# Patient Record
Sex: FEMALE | Race: BLACK OR AFRICAN AMERICAN | NOT HISPANIC OR LATINO | Employment: UNEMPLOYED | URBAN - METROPOLITAN AREA
[De-identification: names, ages, dates, MRNs, and addresses within clinical notes are randomized per-mention and may not be internally consistent; named-entity substitution may affect disease eponyms.]

---

## 2017-03-19 ENCOUNTER — ANESTHESIA EVENT (OUTPATIENT)
Dept: SURGERY | Facility: CLINIC | Age: 62
End: 2017-03-19

## 2017-03-20 ENCOUNTER — OFFICE VISIT (OUTPATIENT)
Dept: SURGERY | Facility: CLINIC | Age: 62
End: 2017-03-20

## 2017-03-20 ENCOUNTER — ALLIED HEALTH/NURSE VISIT (OUTPATIENT)
Dept: SURGERY | Facility: CLINIC | Age: 62
End: 2017-03-20

## 2017-03-20 VITALS
OXYGEN SATURATION: 100 % | HEART RATE: 94 BPM | DIASTOLIC BLOOD PRESSURE: 84 MMHG | WEIGHT: 194 LBS | RESPIRATION RATE: 14 BRPM | TEMPERATURE: 97.4 F | SYSTOLIC BLOOD PRESSURE: 137 MMHG

## 2017-03-20 DIAGNOSIS — H80.90 OTOSCLEROSIS, UNSPECIFIED LATERALITY: ICD-10-CM

## 2017-03-20 DIAGNOSIS — Z01.818 PRE-OP EXAM: Primary | ICD-10-CM

## 2017-03-20 PROBLEM — E66.9 OBESITY: Status: ACTIVE | Noted: 2017-03-20

## 2017-03-20 LAB
ANION GAP SERPL CALCULATED.3IONS-SCNC: 11 MMOL/L (ref 3–14)
BUN SERPL-MCNC: 9 MG/DL (ref 7–30)
CALCIUM SERPL-MCNC: 9.1 MG/DL (ref 8.5–10.1)
CHLORIDE SERPL-SCNC: 107 MMOL/L (ref 94–109)
CO2 SERPL-SCNC: 22 MMOL/L (ref 20–32)
CREAT SERPL-MCNC: 0.44 MG/DL (ref 0.52–1.04)
ERYTHROCYTE [DISTWIDTH] IN BLOOD BY AUTOMATED COUNT: 14.2 % (ref 10–15)
GFR SERPL CREATININE-BSD FRML MDRD: ABNORMAL ML/MIN/1.7M2
GLUCOSE SERPL-MCNC: 82 MG/DL (ref 70–99)
HCT VFR BLD AUTO: 41.9 % (ref 35–47)
HGB BLD-MCNC: 13.3 G/DL (ref 11.7–15.7)
MCH RBC QN AUTO: 29 PG (ref 26.5–33)
MCHC RBC AUTO-ENTMCNC: 31.7 G/DL (ref 31.5–36.5)
MCV RBC AUTO: 92 FL (ref 78–100)
PLATELET # BLD AUTO: 203 10E9/L (ref 150–450)
POTASSIUM SERPL-SCNC: 4 MMOL/L (ref 3.4–5.3)
RBC # BLD AUTO: 4.58 10E12/L (ref 3.8–5.2)
SODIUM SERPL-SCNC: 140 MMOL/L (ref 133–144)
WBC # BLD AUTO: 7.5 10E9/L (ref 4–11)

## 2017-03-20 RX ORDER — ALBUTEROL SULFATE 0.83 MG/ML
2.5 SOLUTION RESPIRATORY (INHALATION)
Status: CANCELLED | OUTPATIENT
Start: 2017-03-20

## 2017-03-20 RX ORDER — CHOLECALCIFEROL (VITAMIN D3) 50 MCG
2000 TABLET ORAL DAILY
COMMUNITY
Start: 2017-01-17 | End: 2019-06-05

## 2017-03-20 RX ORDER — ALBUTEROL SULFATE 0.83 MG/ML
2.5 SOLUTION RESPIRATORY (INHALATION) DAILY
COMMUNITY
Start: 2016-06-30 | End: 2017-04-14

## 2017-03-20 RX ORDER — LORATADINE 10 MG/1
10 TABLET ORAL DAILY
COMMUNITY
Start: 2015-09-11 | End: 2019-02-25

## 2017-03-20 RX ORDER — ALBUTEROL SULFATE 90 UG/1
2 AEROSOL, METERED RESPIRATORY (INHALATION) DAILY
COMMUNITY
Start: 2016-06-30 | End: 2019-02-25

## 2017-03-20 RX ORDER — FLUTICASONE PROPIONATE 50 MCG
2 SPRAY, SUSPENSION (ML) NASAL
COMMUNITY
Start: 2016-06-30 | End: 2019-02-25

## 2017-03-20 RX ORDER — BUDESONIDE AND FORMOTEROL FUMARATE DIHYDRATE 160; 4.5 UG/1; UG/1
2 AEROSOL RESPIRATORY (INHALATION) 2 TIMES DAILY
COMMUNITY
End: 2019-02-25

## 2017-03-20 RX ORDER — CETIRIZINE HYDROCHLORIDE 10 MG/1
10 TABLET ORAL DAILY
COMMUNITY
Start: 2016-09-23

## 2017-03-20 NOTE — Clinical Note
Dr Reyna,  I had the pleasure of seeing your patient in the PAC. Please find the attached H&P.  If you have any questions or concerns I can be reached in the PAC.  Best Regards,  DEEPALI Josue  [facility] Preoperative Assessment Center Phone: [phone] Fax: [phone]

## 2017-03-20 NOTE — PATIENT INSTRUCTIONS
Preparing for Your Surgery      Name:  Karolina Scott   MRN:  [id number]   :  1955   Today's Date:  3/20/2017     Arriving for surgery: Marisol from Dr Reyna's office will call you the day before surgery with the time.  Surgery date:  4/3/17  Surgery time:  To be determined  Arrival time:  To be determined  Please come to:     [facility] [address]  [address] [place]  [address]    - parking is available in front of [facility] from 5:15AM to 8:00PM. If you prefer, park your car in the Green Lot. Enter through the main entrance of the [facility].    -Take the elevator on the right to the 3rd floor, check in at the Adult Surgery Waiting Lounge, also known as 3 A.  [phone]    If an escort is needed stop at the Information Desk in the lobby. Inform the information person that you are here for surgery. An escort to the Adult Surgery Waiting Lounge will be provided.        What can I eat or drink? Nothing to eat or drink after 11:30 pm . Ask Marisol when she calls you for more specific times.  -  You may have solid food or milk products until 8 hours prior to your surgery.  -  You may have water until 2 hours prior to your surgery.    Which medicines can I take? Do not take Aspirin, vitamins, or supplements for 7 days before surgery.    -  It is OKAY to take these medications: Albuterol inhaler, Albuterol nebulizer, Omeprazole (Prilosec),Cetirizine (Zyrtec) if needed, Loratadine (Claritin) if needed, Flonase nasal spray if needed.      How do I prepare myself?  -  Take two showers: one the night before surgery; and one the morning of surgery.         Use 1/2 bottle of Scrubcare to wash from neck down for each shower. You may use your own shampoo and conditioner first.   -  Do NOT use lotion, powder, deodorant, hair spray or gel  the day of your surgery.  -  Do NOT wear any makeup, fingernail polish or jewelry.  .  -Do not bring your  own medications to the hospital, except for inhalers.  -  Bring your ID and insurance card.    Questions or Concerns:  If you have questions or concerns, please call the  Preoperative Assessment Center, Monday-Friday 7AM-7PM:  [phone]    AFTER YOUR SURGERY  Breathing exercises   Breathing exercises help you recover faster. Take deep breaths and let the air out slowly. This will:     Help you wake up after surgery.    Help prevent complications like pneumonia.  Preventing complications will help you go home sooner.   We may give you a breathing device (incentive spirometer) to encourage you to breathe deeply.   Nausea and vomiting   You may feel sick to your stomach after surgery; if so, let your nurse know.    Pain control:  After surgery, you may have pain. Our goal is to help you manage your pain. Pain medicine will help you feel comfortable enough to do activities that will help you heal.  These activities may include breathing exercises, walking and physical therapy.   To help your health care team treat your pain we will ask: 1) If you have pain  2) where it is located 3) describe your pain in your words  Methods of pain control include medications given by mouth, vein or by nerve block for some surgeries.  We may give you a pain control pump that will:  1) Deliver the medicine through a tube placed in your vein  2) Control the amount of medicine you receive  3) Allow you to push a button to deliver a dose of pain medicine  Sequential Compression Device (SCD) or Pneumo Boots:  You may need to wear SCD S on your legs or feet. These are wraps connected to a machine that pumps in air and releases it. The repeated pumping helps prevent blood clots from forming.

## 2017-03-20 NOTE — H&P
Pre-Operative H & P     CC:  Preoperative exam to assess for increased cardiopulmonary risk while undergoing surgery and anesthesia.    Date of Encounter: 3/20/2017  Primary Care Physician:  No primary care provider on file.    AIDAN Scott is a 62 year old female who presents for pre-operative H & P in preparation for Right Transcanal Exploratory Tympanotomy, Probable Stapedectomy, Myringotomy And Tube Right Ear with Dr. Moore on 4/3/2017  at [facility].   She is here today with her son and an .     She has history of moderate persistent asthma and seasonal allergies.  She states over the past 2 weeks she has had a productive cough with green sputum, some congestion and increased wheezing and SOB.  She also has had some mild fevers.  She denies sore throat, rhinitis, chills or body aches.   She has increased the amount of times she uses her nebulizer and has increased wheezing at night in particular.  She hasn't seen her PCP.  She saw pulmonology last in 2016.       History is obtained from the patient.     Past Medical History  Past Medical History   Diagnosis Date     Asthma, mild persistent      GERD (gastroesophageal reflux disease)      Obesity      Seasonal allergies        Past Surgical History  Past Surgical History   Procedure Laterality Date     Cataract iol, rt/lt        section         Hx of Blood transfusions/reactions: Denies     Hx of abnormal bleeding or anti-platelet use: Denies    Menstrual history: No LMP recorded.:    Steroid use in the last year: Denies    Personal or FH with difficulty with Anesthesia:  Denies    Prior to Admission Medications  Current Outpatient Prescriptions   Medication Sig Dispense Refill     albuterol (2.5 MG/3ML) 0.083% neb solution Inhale 2.5 mg into the lungs daily       albuterol (PROAIR HFA/PROVENTIL HFA/VENTOLIN HFA) 108 (90 BASE) MCG/ACT Inhaler Inhale 2 puffs into the lungs daily        Cholecalciferol (VITAMIN D) 2000 UNITS tablet Take 2,000 Units by mouth daily       cetirizine (ZYRTEC) 10 MG tablet Take 10 mg by mouth daily       loratadine (CLARITIN) 10 MG tablet Take 10 mg by mouth daily       omeprazole (PRILOSEC) 20 MG CR capsule Take 20 mg by mouth daily with food       fluticasone (FLONASE) 50 MCG/ACT spray 2 sprays       budesonide-formoterol (SYMBICORT) 160-4.5 MCG/ACT Inhaler Inhale 2 puffs into the lungs 2 times daily         Allergies  Allergies   Allergen Reactions     Corticosteroids Swelling     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     Ibuprofen        Social History  Social History     Social History     Marital status: N/A     Spouse name: N/A     Number of children: N/A     Years of education: N/A     Occupational History     Not on file.     Social History Main Topics     Smoking status: Never Smoker     Smokeless tobacco: Not on file     Alcohol use No     Drug use: Not on file     Sexual activity: Not on file     Other Topics Concern     Not on file     Social History Narrative     No narrative on file       Family History  History reviewed. No pertinent family history.    Review of Systems  Functional status: Independent in ADL's.   METS > 4 .  If no can be limited by her asthma    The complete review of systems is negative other than noted in the HPI or here.     C: NEGATIVE for fever, chills, change in weight  INTEGUMENTARY/SKIN: NEGATIVE for worrisome rashes, moles or lesions  E/M: NEGATIVE for ear, mouth and throat problems  R: see HPI  CV: NEGATIVE for chest pain, palpitations or peripheral edema  GI: NEGATIVE for nausea, abdominal pain, heartburn, or change in bowel habits  :no current menses  MUSCULOSKELETAL: NEGATIVE for significant arthralgias or myalgia  NEURO: NEGATIVE for weakness, dizziness or paresthesias  ENDOCRINE: NEGATIVE for temperature  intolerance, skin/hair changes  HEME/ALLERGY/IMMUNE: NEGATIVE for bleeding problems  PSYCHIATRIC: NEGATIVE for changes in mood or affect      Temp: 97.4  F (36.3  C) Temp src: Oral BP: 137/84 Pulse: 94   Resp: 14 SpO2: 100 %         194 lbs 0 oz  Data Unavailable   There is no height or weight on file to calculate BMI.       Physical Exam  Constitutional: Awake, alert, cooperative, no apparent distress, and appears stated age.  Eyes: Pupils equal, round and reactive to light, extra ocular muscles intact, sclera clear, conjunctiva normal.  HENT: Normocephalic, oral pharynx with moist mucus membranes, good dentition. No goiter appreciated.   Respiratory:  Diffuse expiratory wheezing heard throughout, diminished in bases  Cardiovascular: Regular rate and rhythm, normal S1 and S2, and no murmur noted.  Carotids +2, no bruits. No edema. Palpable pulses to radial  DP and PT arteries.   GI: Normal bowel sounds, soft, non-distended, non-tender, difficult to assess masses and hepatosplenomegaly d/t body habitus.  Surgical scars: NA  Lymph/Hematologic: No cervical lymphadenopathy and no supraclavicular lymphadenopathy.  Genitourinary:  N/A  Skin: Warm and dry.  No rashes at anticipated surgical site.   Musculoskeletal: Full ROM of neck. There is no redness, warmth, or swelling of the joints. Gross motor strength is normal.    Neurologic: Awake, alert, oriented to name, place and time. Cranial nerves II-XII are grossly intact. Gait is normal.   Neuropsychiatric: Calm, cooperative. Normal affect.     Labs: (personally reviewed)  Last Basic Metabolic Panel:  Lab Results   Component Value Date     03/20/2017      Lab Results   Component Value Date    POTASSIUM 4.0 03/20/2017     Lab Results   Component Value Date    CHLORIDE 107 03/20/2017     Lab Results   Component Value Date    JOSE 9.1 03/20/2017     Lab Results   Component Value Date    CO2 22 03/20/2017     Lab Results   Component Value Date    BUN 9 03/20/2017     Lab  Results   Component Value Date    CR 0.44 03/20/2017     Lab Results   Component Value Date    GLC 82 03/20/2017     CBC RESULTS:   Recent Labs   Lab Test  03/20/17   1259   WBC  7.5   RBC  4.58   HGB  13.3   HCT  41.9   MCV  92   MCH  29.0   MCHC  31.7   RDW  14.2   PLT  203     PFT: ANCILLARY DATA: Spirometry in clinic on 6/30/2016:  FEV1 1.34 or 80 percent of predicted, FVC 1.68 or 78 percent of predicted, with a ratio of 0.80. Results indicate mild restrictive physiology.           ASSESSMENT and PLAN  Karolina Scott is a 62 year old female scheduled to undergo Right Transcanal Exploratory Tympanotomy, Probable Stapedectomy, Myringotomy And Tube Right Ear with Dr. Moore on 4/3/2017  at [facility]. She has the following specific operative considerations:     1. Cardiac- no known history, >4 METS when not symptomatic with asthma  2. RESP- moderate persistent with acute URI and exacerbation of symptoms  - Patient will go see PCP- instructed to notify surgeon if symptoms don't resolve.  (Surgeon also notified today)  -Instructed to take all inhalers the DOS and bring them with   3. Seasonal allergies  - instructed to continue medications  4. Anesthesia- obese, <3 TM however airway appears feasible.  The patient doesn't recall having general however denies previous complications     - RCRI : 0.4% risk of major adverse cardiac event.   - Anesthesia considerations:  Refer to PAC assessment in anesthesia records  - VTE risk:0.5%  - KATHRYN # of risks - Low risk, obese/age  - Risk of PONV score = 2.  If > 2, anti-emetic intervention recommended.      Patient was discussed with Dr. Wilson.    MCKAYLA Weathers CNP  Preoperative Assessment Center  [facility]  Phone: [phone]  Fax: [phone]

## 2017-03-20 NOTE — MR AVS SNAPSHOT
After Visit Summary   3/20/2017    Karolina Scott    MRN: [id number]           Patient Information     Date Of Birth          1955        Visit Information        Provider Department      3/20/2017 10:45 AM Rn,  Pac; [facility] Preoperative Assessment Center        Care Instructions    Preparing for Your Surgery      Name:  Karolina Scott   MRN:  [id number]   :  1955   Today's Date:  3/20/2017     Arriving for surgery: Marisol from Dr Reyna's office will call you the day before surgery with the time.  Surgery date:  4/3/17  Surgery time:  To be determined  Arrival time:  To be determined  Please come to:     [facility] [address]  [address] [place]  [address]    - parking is available in front of [facility] from 5:15AM to 8:00PM. If you prefer, park your car in the Green Lot. Enter through the main entrance of the [facility].    -Take the elevator on the right to the 3rd floor, check in at the Adult Surgery Waiting Lounge, also known as 3 A.  [phone]    If an escort is needed stop at the Information Desk in the lobby. Inform the information person that you are here for surgery. An escort to the Adult Surgery Waiting Lounge will be provided.        What can I eat or drink? Nothing to eat or drink after 11:30 pm . Ask Marisol when she calls you for more specific times.  -  You may have solid food or milk products until 8 hours prior to your surgery.  -  You may have water until 2 hours prior to your surgery.    Which medicines can I take? Do not take Aspirin, vitamins, or supplements for 7 days before surgery.    -  It is OKAY to take these medications: Albuterol inhaler, Albuterol nebulizer, Omeprazole (Prilosec),Cetirizine (Zyrtec) if needed, Loratadine (Claritin) if needed, Flonase nasal spray if needed.      How do I prepare myself?  -  Take two showers: one the night before surgery; and  one the morning of surgery.         Use 1/2 bottle of Scrubcare to wash from neck down for each shower. You may use your own shampoo and conditioner first.   -  Do NOT use lotion, powder, deodorant, hair spray or gel  the day of your surgery.  -  Do NOT wear any makeup, fingernail polish or jewelry.  .  -Do not bring your own medications to the hospital, except for inhalers.  -  Bring your ID and insurance card.    Questions or Concerns:  If you have questions or concerns, please call the  Preoperative Assessment Center, Monday-Friday 7AM-7PM:  [phone]    AFTER YOUR SURGERY  Breathing exercises   Breathing exercises help you recover faster. Take deep breaths and let the air out slowly. This will:     Help you wake up after surgery.    Help prevent complications like pneumonia.  Preventing complications will help you go home sooner.   We may give you a breathing device (incentive spirometer) to encourage you to breathe deeply.   Nausea and vomiting   You may feel sick to your stomach after surgery; if so, let your nurse know.    Pain control:  After surgery, you may have pain. Our goal is to help you manage your pain. Pain medicine will help you feel comfortable enough to do activities that will help you heal.  These activities may include breathing exercises, walking and physical therapy.   To help your health care team treat your pain we will ask: 1) If you have pain  2) where it is located 3) describe your pain in your words  Methods of pain control include medications given by mouth, vein or by nerve block for some surgeries.  We may give you a pain control pump that will:  1) Deliver the medicine through a tube placed in your vein  2) Control the amount of medicine you receive  3) Allow you to push a button to deliver a dose of pain medicine  Sequential Compression Device (SCD) or Pneumo Boots:  You may need to wear SCD S on your legs or feet. These are wraps connected to a machine that pumps in air and  releases it. The repeated pumping helps prevent blood clots from forming.               Follow-ups after your visit        Your next 10 appointments already scheduled     Mar 20, 2017 12:30 PM CDT   (Arrive by 12:25 PM)   PAC Anesthesia Consult with  Pac Anesthesiologist, [employer]   [facility] Preoperative [facility] ([facility])    [address] [place] [address]  [facility] [id number] [phone]            Mar 20, 2017  1:30 PM CDT   LAB with  LAB   [facility] ([facility])    [address] [place] [id number] [phone]           Patient must bring picture ID.  Patient should be prepared to give a urine specimen  Please do not eat 10-12 hours before your appointment if you are coming in fasting for labs on lipids, cholesterol, or glucose (sugar).  Pregnant women should follow their Care Team instructions. Water with medications is okay. Do not drink coffee or other fluids.   If you have concerns about taking  your medications, please ask at office or if scheduling via [employer], send a message by clicking on Secure Messaging, Message Your Care Team.            Apr 03, 2017   Procedure with Manpreet Reyna MD   [facility], [place], Same Day Surgery (--)    [address] [address] [phone]              Who to contact     Please call your clinic at [phone] to:    Ask questions about your health    Make or cancel appointments    Discuss your medicines    Learn about your test results    Speak to your doctor   If you have compliments or concerns about an experience at your clinic, or if you wish to file a complaint, please contact [facility] Patient Relations at [phone] or email us at [email or web address]         Additional Information About Your Visit        Xiam Information     [employer] is an electronic gateway that provides easy, online  access to your medical records. With [employer], you can request a clinic appointment, read your test results, renew a prescription or communicate with your care team.     To sign up for [employer] visit the website at [email or web address]   You will be asked to enter the access code listed below, as well as some personal information. Please follow the directions to create your username and password.     Your access code is: [email or web address]  Expires: 2017  7:30 AM     Your access code will  in 90 days. If you need help or a new code, please contact your [facility] or call [phone] for assistance.        Care EveryWhere ID     This is your Care EveryWhere ID. This could be used by other organizations to access your [facility] medical records  [id number]         Blood Pressure from Last 3 Encounters:   17 137/84    Weight from Last 3 Encounters:   17 88 kg (194 lb)              Today, you had the following     No orders found for display       Primary Care Provider    None Specified       No primary provider on file.        Thank you!     Thank you for choosing [facility] PREOPERATIVE [facility]ESS[facility]  for your care. Our goal is always to provide you with excellent care. Hearing back from our patients is one way we can continue to improve our services. Please take a few minutes to complete the written survey that you may receive in the mail after your visit with us. Thank you!             Your Updated Medication List - Protect others around you: Learn how to safely use, store and throw away your medicines at www.disposemymeds.org.          This list is accurate as of: 3/20/17 12:22 PM.  Always use your most recent med list.                   Brand Name Dispense Instructions for use    * albuterol (2.5 MG/3ML) 0.083% neb solution      Inhale 2.5 mg into the lungs daily       * albuterol 108 (90 BASE) MCG/ACT Inhaler    PROAIR HFA/PROVENTIL HFA/VENTOLIN HFA      Inhale 2 puffs into the lungs daily       budesonide-formoterol 160-4.5 MCG/ACT Inhaler    SYMBICORT     Inhale 2 puffs into the lungs 2 times daily       cetirizine 10 MG tablet    zyrTEC     Take 10 mg by mouth daily       fluticasone 50 MCG/ACT spray    FLONASE     2 sprays       loratadine 10 MG tablet    CLARITIN     Take 10 mg by mouth daily       omeprazole 20 MG CR capsule    priLOSEC     Take 20 mg by mouth daily with food       vitamin D 2000 UNITS tablet      Take 2,000 Units by mouth daily       * Notice:  This list has 2 medication(s) that are the same as other medications prescribed for you. Read the directions carefully, and ask your doctor or other care provider to review them with you.

## 2017-03-20 NOTE — ANESTHESIA PREPROCEDURE EVALUATION
Anesthesia Evaluation     . Pt has had prior anesthetic. Type: MAC      ROS/MED HX    ENT/Pulmonary:     (+)Mild Persistent asthma Treatment: Inhaled steroids and Inhaler daily,  , recent URI unresolved cough, congestion, wheezing for 2 weeks: . .    Neurologic:       Cardiovascular:     (+) ----. : . . . :. . Previous cardiac testing       METS/Exercise Tolerance: Comment: She can walk 20 minutes without chest pain\  2 flights of stairs without symptoms    Hematologic:  - neg hematologic  ROS       Musculoskeletal:  - neg musculoskeletal ROS       GI/Hepatic:     (+) GERD Asymptomatic on medication,       Renal/Genitourinary:         Endo:     (+) Obesity, .      Psychiatric:         Infectious Disease:   (+) Recent Fever,       Malignancy:      - no malignancy   Other:    (+) No chance of pregnancy C-spine cleared: N/A, no H/O Chronic Pain,no other significant disability              Physical Exam  Normal systems: dental    Airway   Mallampati: II  TM distance: <3 FB  Neck ROM: full    Dental     Cardiovascular   Rhythm and rate: regular and normal      Pulmonary (+) wheezes     PE comment: Diffuse wheezing heard throughout               PAC Discussion and Assessment    ASA Classification: 3  Case is suitable for: [facility]  Anesthetic techniques and relevant risks discussed: GA  Invasive monitoring and risk discussed: No  Types:   Possibility and Risk of blood transfusion discussed: No  NPO instructions given: NPO after midnight  Additional anesthetic preparation and risks discussed:   Needs early admission to pre-op area:   Other:     PAC Resident/NP Anesthesia Assessment:  ASSESSMENT and PLAN  Karolina Scott is a 62 year old female scheduled to undergo Right Transcanal Exploratory Tympanotomy, Probable Stapedectomy, Myringotomy And Tube Right Ear with Dr. Moore on 4/3/2017  at [facility]. She has the following specific operative considerations:     1. Cardiac- no  known history, >4 METS when not symptomatic with asthma  2. RESP- moderate persistent with acute URI and exacerbation of symptoms  - Patient will go see PCP- instructed to notify surgeon if symptoms don't resolve.  (Surgeon also notified today)  -Instructed to take all inhalers the DOS and bring them with   3. Seasonal allergies  - instructed to continue medications  4. Anesthesia- obese, <3 TM however airway appears feasible.  The patient doesn't recall having general however denies previous complications     - RCRI : 0.4% risk of major adverse cardiac event.   - VTE risk:0.5%  - KATHRYN # of risks - Low risk, obese/age  - Risk of PONV score = 2.  If > 2, anti-emetic intervention recommended.      Patient was discussed with Dr. Wilson.    MCKAYLA Weathers CNP      Mid-Level Provider/Resident:   Date:   Time:     Attending Anesthesiologist Anesthesia Assessment:  62 year old for transcanal exploratory tympanotomy possible stapedectomy and myringotomy. Chart reviewed, patient seen and evaluated; agree with above assessment.Patient has no cardiac disease, but does have mild persistent asthma with a current exacerbation due to URI. On exam, she does have wheezing, so we have asked her to see her PCP to determine whether any further intervention is appropriate.    Patient is appropriate for the planned procedure without further workup or medical management change. The final anesthesia plan will be determined by the physician anesthesiologist caring for the patient on the day of surgery.      Reviewed and Signed by PAC Anesthesiologist  Anesthesiologist: RYAN  Date: 3/20/2017  Time:   Pass/Fail: Pass  Disposition:     PAC Pharmacist Assessment:        Pharmacist:   Date:   Time:                           .

## 2017-04-13 ENCOUNTER — HOSPITAL ENCOUNTER (EMERGENCY)
Facility: CLINIC | Age: 62
Discharge: HOME | End: 2017-04-14
Attending: EMERGENCY MEDICINE | Admitting: EMERGENCY MEDICINE
Payer: COMMERCIAL

## 2017-04-13 DIAGNOSIS — J45.901 ASTHMA EXACERBATION: ICD-10-CM

## 2017-04-13 PROCEDURE — 94640 AIRWAY INHALATION TREATMENT: CPT

## 2017-04-13 PROCEDURE — 99284 EMERGENCY DEPT VISIT MOD MDM: CPT | Mod: Z6 | Performed by: EMERGENCY MEDICINE

## 2017-04-13 PROCEDURE — 99284 EMERGENCY DEPT VISIT MOD MDM: CPT | Mod: 25

## 2017-04-13 NOTE — ED AVS SNAPSHOT
[facility], [place], Emergency Department    [address]    [place] [address]    Phone:  [phone]    Fax:  [phone]                                       Karolina Scott   MRN: [id number]    Department:  [facility], Emergency Department   Date of Visit:  4/13/2017           After Visit Summary Signature Page     I have received my discharge instructions, and my questions have been answered. I have discussed any challenges I see with this plan with the nurse or doctor.    ..........................................................................................................................................  Patient/Patient Representative Signature      ..........................................................................................................................................  Patient Representative Print Name and Relationship to Patient    ..................................................               ................................................  Date                                            Time    ..........................................................................................................................................  Reviewed by Signature/Title    ...................................................              ..............................................  Date                                                            Time

## 2017-04-13 NOTE — ED AVS SNAPSHOT
[facility], Emergency Department    [address]    [place] [address]    Phone:  [phone]    Fax:  [phone]                                       Karolina Scott   MRN: [id number]    Department:  [facility], Emergency Department   Date of Visit:  4/13/2017           Patient Information     Date Of Birth          1955        Your diagnoses for this visit were:     Asthma exacerbation        You were seen by Richi Nicholas MD.        Discharge Instructions       Take prednisone as directed.  Use neb treatments as needed.  Clinic follow up this week.  Return if persistent symptoms.    24 Hour Appointment Hotline       To make an appointment at any [facility] clinic, call [phone] ([phone]). If you don't have a family doctor or clinic, we will help you find one. [facility] clinics are conveniently located to serve the needs of you and your family.             Review of your medicines      START taking        Dose / Directions Last dose taken    predniSONE 20 MG tablet   Commonly known as:  DELTASONE   Quantity:  10 tablet        Take two tablets (= 40mg) each day for 5 (five) days   Refills:  0          CONTINUE these medicines which may have CHANGED, or have new prescriptions. If we are uncertain of the size of tablets/capsules you have at home, strength may be listed as something that might have changed.        Dose / Directions Last dose taken    * albuterol 108 (90 BASE) MCG/ACT Inhaler   Commonly known as:  PROAIR HFA/PROVENTIL HFA/VENTOLIN HFA   Dose:  2 puff   What changed:  Another medication with the same name was changed. Make sure you understand how and when to take each.        Inhale 2 puffs into the lungs daily   Refills:  0        * albuterol (2.5 MG/3ML) 0.083% neb solution   Dose:  2.5 mg   What changed:    - how to take this  - when to take this  - reasons to take this   Quantity:  360 mL        Take 1 vial (2.5 mg) by nebulization every 4 hours as needed for  shortness of breath / dyspnea or wheezing   Refills:  1        * Notice:  This list has 2 medication(s) that are the same as other medications prescribed for you. Read the directions carefully, and ask your doctor or other care provider to review them with you.      Our records show that you are taking the medicines listed below. If these are incorrect, please call your family doctor or clinic.        Dose / Directions Last dose taken    budesonide-formoterol 160-4.5 MCG/ACT Inhaler   Commonly known as:  SYMBICORT   Dose:  2 puff        Inhale 2 puffs into the lungs 2 times daily   Refills:  0        cetirizine 10 MG tablet   Commonly known as:  zyrTEC   Dose:  10 mg        Take 10 mg by mouth daily   Refills:  0        fluticasone 50 MCG/ACT spray   Commonly known as:  FLONASE   Dose:  2 spray        2 sprays   Refills:  0        loratadine 10 MG tablet   Commonly known as:  CLARITIN   Dose:  10 mg        Take 10 mg by mouth daily   Refills:  0        omeprazole 20 MG CR capsule   Commonly known as:  priLOSEC   Dose:  20 mg        Take 20 mg by mouth daily with food   Refills:  0        vitamin D 2000 UNITS tablet   Dose:  2000 Units        Take 2,000 Units by mouth daily   Refills:  0                Prescriptions were sent or printed at these locations (2 Prescriptions)                   Other Prescriptions                Printed at Department/Unit printer (2 of 2)         albuterol (2.5 MG/3ML) 0.083% neb solution               predniSONE (DELTASONE) 20 MG tablet                Procedures and tests performed during your visit     Chest XR,  PA & LAT      Orders Needing Specimen Collection     None      Pending Results     No orders found for last 3 day(s).            Pending Culture Results     No orders found for last 3 day(s).            Thank you for choosing [facility]       Thank you for choosing [facility] for your care. Our goal is always to provide you with excellent care. Hearing back from our patients is  "one way we can continue to improve our services. Please take a few minutes to complete the written survey that you may receive in the mail after you visit with us. Thank you!        [facility] Information     [facility] lets you send messages to your doctor, view your test results, renew your prescriptions, schedule appointments and more. To sign up, go to [email or web address] . Click on \"Log in\" on the left side of the screen, which will take you to the Welcome page. Then click on \"Sign up Now\" on the right side of the page.     You will be asked to enter the access code listed below, as well as some personal information. Please follow the directions to create your username and password.     Your access code is: [email or web address]  Expires: 2017  7:30 AM     Your access code will  in 90 days. If you need help or a new code, please call your [facility] clinic or [phone].        Care EveryWhere ID     This is your Care EveryWhere ID. This could be used by other organizations to access your [facility] medical records  [id number]        After Visit Summary       This is your record. Keep this with you and show to your community pharmacist(s) and doctor(s) at your next visit.                  "

## 2017-04-14 ENCOUNTER — APPOINTMENT (OUTPATIENT)
Dept: GENERAL RADIOLOGY | Facility: CLINIC | Age: 62
End: 2017-04-14
Attending: EMERGENCY MEDICINE
Payer: COMMERCIAL

## 2017-04-14 VITALS
OXYGEN SATURATION: 97 % | HEART RATE: 108 BPM | DIASTOLIC BLOOD PRESSURE: 73 MMHG | TEMPERATURE: 97.4 F | RESPIRATION RATE: 16 BRPM | SYSTOLIC BLOOD PRESSURE: 142 MMHG

## 2017-04-14 PROCEDURE — 71020 XR CHEST 2 VW: CPT

## 2017-04-14 PROCEDURE — 25000125 ZZHC RX 250: Performed by: EMERGENCY MEDICINE

## 2017-04-14 RX ORDER — IPRATROPIUM BROMIDE AND ALBUTEROL SULFATE 2.5; .5 MG/3ML; MG/3ML
3 SOLUTION RESPIRATORY (INHALATION) ONCE
Status: COMPLETED | OUTPATIENT
Start: 2017-04-14 | End: 2017-04-14

## 2017-04-14 RX ORDER — PREDNISONE 20 MG/1
40 TABLET ORAL ONCE
Status: COMPLETED | OUTPATIENT
Start: 2017-04-14 | End: 2017-04-14

## 2017-04-14 RX ORDER — PREDNISONE 20 MG/1
TABLET ORAL
Qty: 10 TABLET | Refills: 0 | Status: SHIPPED | OUTPATIENT
Start: 2017-04-14 | End: 2019-02-25

## 2017-04-14 RX ORDER — ALBUTEROL SULFATE 0.83 MG/ML
2.5 SOLUTION RESPIRATORY (INHALATION) EVERY 4 HOURS PRN
Qty: 360 ML | Refills: 1 | Status: SHIPPED | OUTPATIENT
Start: 2017-04-14 | End: 2019-02-25

## 2017-04-14 RX ADMIN — IPRATROPIUM BROMIDE AND ALBUTEROL SULFATE 3 ML: .5; 3 SOLUTION RESPIRATORY (INHALATION) at 00:48

## 2017-04-14 RX ADMIN — IPRATROPIUM BROMIDE AND ALBUTEROL SULFATE 3 ML: .5; 3 SOLUTION RESPIRATORY (INHALATION) at 01:36

## 2017-04-14 RX ADMIN — PREDNISONE 40 MG: 20 TABLET ORAL at 02:38

## 2017-04-14 ASSESSMENT — ENCOUNTER SYMPTOMS
COUGH: 1
SHORTNESS OF BREATH: 1
WHEEZING: 1
FEVER: 0

## 2017-04-14 NOTE — DISCHARGE INSTRUCTIONS
Take prednisone as directed.  Use neb treatments as needed.  Clinic follow up this week.  Return if persistent symptoms.

## 2017-04-14 NOTE — ED PROVIDER NOTES
History     Chief Complaint   Patient presents with     Asthma     Pt reports SOB w/cough associated w/asthma. Has taken inhalers/nebs w/no improvement     HPI  Karolina Scott is a 62 year old female with a history of asthma and seasonal allergies who presents to the emergency department today with complaints of increased cough, congestion, shortness of breath and wheezing. Patient reports symptoms over the past 2 weeks, though states they became increasingly worse today. Patient reports using her inhaler and nebulizer without significant relief. She states her symptoms are similar to previous asthma exacerbations. She denies noted fevers. She reports a reaction to prednisone and states she gets diffuse itching with this but it does seem to help her breathing symptoms. No history of hospitalization or intubation for asthma.    I have reviewed the Medications, Allergies, Past Medical and Surgical History, and Social History in the [facility] system.    Past Medical History:   Diagnosis Date     Asthma, mild persistent      GERD (gastroesophageal reflux disease)      Obesity      Seasonal allergies        Past Surgical History:   Procedure Laterality Date     CATARACT IOL, RT/LT        SECTION         No family history on file.    Social History   Substance Use Topics     Smoking status: Never Smoker     Smokeless tobacco: Not on file     Alcohol use No     No current facility-administered medications for this encounter.      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     Ibuprofen        Review of Systems   Constitutional: Negative.  Negative for activity change, appetite change, chills, diaphoresis, fatigue and fever.   HENT: Positive for congestion and rhinorrhea. Negative for ear pain, facial swelling, mouth sores, postnasal drip, sinus pressure, sore throat and trouble swallowing.    Eyes: Negative for pain and visual disturbance.   Respiratory: Positive for cough, shortness of breath and wheezing. Negative for choking and chest tightness.    Cardiovascular: Negative for chest pain, palpitations and leg swelling.   Gastrointestinal: Negative for abdominal pain, diarrhea, nausea and vomiting.   Musculoskeletal: Negative for arthralgias, myalgias and neck pain.   Skin: Negative for rash.   Allergic/Immunologic: Negative for immunocompromised state.   Neurological: Negative for dizziness, syncope, weakness, light-headedness and headaches.   Hematological: Does not bruise/bleed easily.   Psychiatric/Behavioral: Negative for confusion.   All other systems reviewed and are negative.      Physical Exam   BP: 148/72  Pulse: 103  Temp: 97.4  F (36.3  C)  Resp: 18  SpO2: 98 %  Physical Exam   Constitutional: She appears well-developed and well-nourished. No distress.   HENT:   Head: Normocephalic and atraumatic.   Right Ear: Tympanic membrane and ear canal normal.   Left Ear: Tympanic membrane and ear canal normal.   Nose: Mucosal edema and rhinorrhea present.   Mouth/Throat: Uvula is midline, oropharynx is clear and moist and mucous membranes are normal. No oral lesions. No uvula swelling.   Eyes: Conjunctivae and EOM are normal.   Neck: Normal range of motion. Neck supple.   Cardiovascular: Normal rate, regular rhythm, normal heart sounds and intact distal pulses.     Pulmonary/Chest: Effort normal. No respiratory distress. She has wheezes. She has no rales.   Abdominal: Soft. There is no tenderness.   Musculoskeletal: She exhibits no edema or tenderness.   Neurological: She is alert.   Skin: Skin is warm and dry.   Psychiatric: She has a normal mood and affect. Her behavior is normal.   Nursing note and vitals reviewed.      ED Course     ED Course     Procedures             Critical Care time:  none               Labs Ordered and Resulted from Time of ED Arrival Up to the Time of Departure from the ED - No data to display    Assessments & Plan (with Medical Decision Making)   Asthma exacerbation. Much improved with treatment in ED. Will treat with 5 day course of prednisone and albuterol prn. Clinic follow up this week. Return if persistent symptoms. No respiratory distress.    I have reviewed the nursing notes.    I have reviewed the findings, diagnosis, plan and need for follow up with the patient.    Discharge Medication List as of 4/14/2017  2:45 AM      START taking these medications    Details   predniSONE (DELTASONE) 20 MG tablet Take two tablets (= 40mg) each day for 5 (five) days, Disp-10 tablet, R-0, Local Print             Final diagnoses:   Asthma exacerbation   Cookie CROCKER, am serving as a trained medical scribe to document services personally performed by Richi Nicholas MD, based on the provider's statements to me.      Richi CROCKER MD, was physically present and have reviewed and verified the accuracy of this note documented by Cookie Calvert.       4/13/2017   [facility], EMERGENCY DEPARTMENT     Richi Nicholas MD  05/14/17 0025

## 2017-05-14 ASSESSMENT — ENCOUNTER SYMPTOMS
CHEST TIGHTNESS: 0
SINUS PRESSURE: 0
EYE PAIN: 0
ACTIVITY CHANGE: 0
MYALGIAS: 0
DIZZINESS: 0
FACIAL SWELLING: 0
PALPITATIONS: 0
APPETITE CHANGE: 0
BRUISES/BLEEDS EASILY: 0
HEADACHES: 0
NECK PAIN: 0
RHINORRHEA: 1
CHOKING: 0
SORE THROAT: 0
CHILLS: 0
ARTHRALGIAS: 0
DIARRHEA: 0
CONSTITUTIONAL NEGATIVE: 1
FATIGUE: 0
DIAPHORESIS: 0
CONFUSION: 0
ABDOMINAL PAIN: 0
TROUBLE SWALLOWING: 0
LIGHT-HEADEDNESS: 0
WEAKNESS: 0
VOMITING: 0
NAUSEA: 0

## 2017-08-25 ENCOUNTER — OFFICE VISIT (OUTPATIENT)
Dept: URGENT CARE | Facility: URGENT CARE | Age: 62
End: 2017-08-25
Payer: COMMERCIAL

## 2017-08-25 VITALS
DIASTOLIC BLOOD PRESSURE: 63 MMHG | OXYGEN SATURATION: 96 % | TEMPERATURE: 98 F | HEART RATE: 90 BPM | RESPIRATION RATE: 20 BRPM | WEIGHT: 190.6 LBS | SYSTOLIC BLOOD PRESSURE: 143 MMHG

## 2017-08-25 DIAGNOSIS — J45.901 ASTHMA EXACERBATION: Primary | ICD-10-CM

## 2017-08-25 PROCEDURE — 99203 OFFICE O/P NEW LOW 30 MIN: CPT | Mod: 25 | Performed by: FAMILY MEDICINE

## 2017-08-25 PROCEDURE — 94640 AIRWAY INHALATION TREATMENT: CPT | Performed by: FAMILY MEDICINE

## 2017-08-25 RX ORDER — ALBUTEROL SULFATE 0.83 MG/ML
1 SOLUTION RESPIRATORY (INHALATION) EVERY 6 HOURS PRN
Qty: 25 VIAL | Refills: 0 | Status: SHIPPED | OUTPATIENT
Start: 2017-08-25 | End: 2019-02-25

## 2017-08-25 RX ORDER — PREDNISONE 20 MG/1
20 TABLET ORAL 2 TIMES DAILY
Qty: 10 TABLET | Refills: 0 | Status: SHIPPED | OUTPATIENT
Start: 2017-08-25 | End: 2017-08-30

## 2017-08-25 RX ORDER — ALBUTEROL SULFATE 0.83 MG/ML
SOLUTION RESPIRATORY (INHALATION)
Qty: 1 VIAL | Refills: 0
Start: 2017-08-25 | End: 2019-02-25

## 2017-08-25 RX ORDER — MONTELUKAST SODIUM 10 MG/1
10 TABLET ORAL AT BEDTIME
COMMUNITY
End: 2019-02-25

## 2017-08-25 NOTE — NURSING NOTE
Chief Complaint   Patient presents with     Cough     cough and difficlutly breathing x 5 days        Initial /63  Pulse 90  Temp 98  F (36.7  C) (Oral)  Resp 20  Wt 190 lb 9.6 oz (86.5 kg)  SpO2 96% There is no height or weight on file to calculate BMI.  Medication Reconciliation: complete

## 2017-08-25 NOTE — MR AVS SNAPSHOT
"              After Visit Summary   2017    Karolina Scott    MRN: [id number]           Patient Information     Date Of Birth          1955        Visit Information        Provider Department      2017 9:10 AM Morteza May, DO [facility]        Today's Diagnoses     Asthma exacerbation    -  1       Follow-ups after your visit        Who to contact     If you have questions or need follow up information about today's clinic visit or your schedule please contact [facility] directly at [phone].  Normal or non-critical lab and imaging results will be communicated to you by [facility] [employer], letter or phone within 4 business days after the clinic has received the results. If you do not hear from us within 7 days, please contact the clinic through [facility] [employer] or phone. If you have a critical or abnormal lab result, we will notify you by phone as soon as possible.  Submit refill requests through [employer] or call your pharmacy and they will forward the refill request to us. Please allow 3 business days for your refill to be completed.          Additional Information About Your Visit        MyChart Information     Pharmly lets you send messages to your doctor, view your test results, renew your prescriptions, schedule appointments and more. To sign up, go to [email or web address] . Click on \"Log in\" on the left side of the screen, which will take you to the Welcome page. Then click on \"Sign up Now\" on the right side of the page.     You will be asked to enter the access code listed below, as well as some personal information. Please follow the directions to create your username and password.     Your access code is: [email or web address]  Expires: 2017 10:07 AM     Your access code will  in 90 days. If you need help or a new code, please call your [facility] clinic or [phone].        Care EveryWhere ID     This is your Care EveryWhere ID. This could be " used by other organizations to access your [facility] medical records  [id number]        Your Vitals Were     Pulse Temperature Respirations Pulse Oximetry          90 98  F (36.7  C) (Oral) 20 96%         Blood Pressure from Last 3 Encounters:   08/25/17 143/63   04/14/17 142/73   03/20/17 137/84    Weight from Last 3 Encounters:   08/25/17 190 lb 9.6 oz (86.5 kg)   03/20/17 194 lb (88 kg)              We Performed the Following     ALBUTEROL UNIT DOSE, 1 MG -      INHALATION/NEBULIZER TREATMENT, INITIAL     INHALATION/NEBULIZER TREATMENT, INITIAL          Today's Medication Changes          These changes are accurate as of: 8/25/17 10:07 AM.  If you have any questions, ask your nurse or doctor.               These medicines have changed or have updated prescriptions.        Dose/Directions    * albuterol 108 (90 BASE) MCG/ACT Inhaler   Commonly known as:  PROAIR HFA/PROVENTIL HFA/VENTOLIN HFA   This may have changed:  Another medication with the same name was added. Make sure you understand how and when to take each.   Changed by:  Marcos  Pac Mariaelena        Dose:  2 puff   Inhale 2 puffs into the lungs daily   Refills:  0       * albuterol (2.5 MG/3ML) 0.083% neb solution   This may have changed:  Another medication with the same name was added. Make sure you understand how and when to take each.        Dose:  2.5 mg   Take 1 vial (2.5 mg) by nebulization every 4 hours as needed for shortness of breath / dyspnea or wheezing   Quantity:  360 mL   Refills:  1       * albuterol (2.5 MG/3ML) 0.083% neb solution   This may have changed:  You were already taking a medication with the same name, and this prescription was added. Make sure you understand how and when to take each.   Used for:  Asthma exacerbation   Changed by:  Morteza May, DO        1 neb in clinic   Quantity:  1 vial   Refills:  0       * albuterol (2.5 MG/3ML) 0.083% neb solution   This may have changed:  You were already taking a medication with  the same name, and this prescription was added. Make sure you understand how and when to take each.   Used for:  Asthma exacerbation   Changed by:  Morteza Mya DO        Dose:  1 vial   Take 1 vial (2.5 mg) by nebulization every 6 hours as needed for shortness of breath / dyspnea or wheezing   Quantity:  25 vial   Refills:  0       * predniSONE 20 MG tablet   Commonly known as:  DELTASONE   This may have changed:  Another medication with the same name was added. Make sure you understand how and when to take each.        Take two tablets (= 40mg) each day for 5 (five) days   Quantity:  10 tablet   Refills:  0       * predniSONE 20 MG tablet   Commonly known as:  DELTASONE   This may have changed:  You were already taking a medication with the same name, and this prescription was added. Make sure you understand how and when to take each.   Used for:  Asthma exacerbation   Changed by:  Morteza May DO        Dose:  20 mg   Take 1 tablet (20 mg) by mouth 2 times daily for 5 days   Quantity:  10 tablet   Refills:  0       * Notice:  This list has 6 medication(s) that are the same as other medications prescribed for you. Read the directions carefully, and ask your doctor or other care provider to review them with you.         Where to get your medicines      These medications [id number] - [place] [address] [address]     Phone:  [phone]     albuterol (2.5 MG/3ML) 0.083% neb solution    predniSONE 20 MG tablet         Some of these will need a paper prescription and others can be bought over the counter.  Ask your nurse if you have questions.     You don't need a prescription for these medications     albuterol (2.5 MG/3ML) 0.083% neb solution                Primary Care Provider Office Phone # Fax #    Narinder Baez [phone]       [place] [address] [address]  [facility] [address]        Equal  Access to Services     [facility]: Hadii elfego ford tyrese Cotto, waronda luqadaha, qaalisha kagianni gaylaeverardonola, waxsherice emely prietolarissazachariah ho. So [facility] [phone].    ATENCIÓN: Si cherylela cynthia, tiene a tompkins disposición servicios gratuitos de asistencia lingüística. Llame al [phone].    We comply with applicable federal civil rights laws and [place] laws. We do not discriminate on the basis of race, color, national origin, age, disability sex, sexual orientation or gender identity.            Thank you!     Thank you for choosing [facility]  for your care. Our goal is always to provide you with excellent care. Hearing back from our patients is one way we can continue to improve our services. Please take a few minutes to complete the written survey that you may receive in the mail after your visit with us. Thank you!             Your Updated Medication List - Protect others around you: Learn how to safely use, store and throw away your medicines at www.disposemymeds.org.          This list is accurate as of: 8/25/17 10:07 AM.  Always use your most recent med list.                   Brand Name Dispense Instructions for use Diagnosis    * albuterol 108 (90 BASE) MCG/ACT Inhaler    PROAIR HFA/PROVENTIL HFA/VENTOLIN HFA     Inhale 2 puffs into the lungs daily        * albuterol (2.5 MG/3ML) 0.083% neb solution     360 mL    Take 1 vial (2.5 mg) by nebulization every 4 hours as needed for shortness of breath / dyspnea or wheezing        * albuterol (2.5 MG/3ML) 0.083% neb solution     1 vial    1 neb in clinic    Asthma exacerbation       * albuterol (2.5 MG/3ML) 0.083% neb solution     25 vial    Take 1 vial (2.5 mg) by nebulization every 6 hours as needed for shortness of breath / dyspnea or wheezing    Asthma exacerbation       budesonide-formoterol 160-4.5 MCG/ACT Inhaler    SYMBICORT     Inhale 2 puffs into the lungs 2 times daily        cetirizine 10 MG tablet     zyrTEC     Take 10 mg by mouth daily        fluticasone 50 MCG/ACT spray    FLONASE     2 sprays        loratadine 10 MG tablet    CLARITIN     Take 10 mg by mouth daily        mometasone 110 MCG/INH inhaler    ASMANEX     Inhale 1 puff into the lungs every evening        montelukast 10 MG tablet    SINGULAIR     Take 10 mg by mouth At Bedtime        omeprazole 20 MG CR capsule    priLOSEC     Take 20 mg by mouth daily with food        * predniSONE 20 MG tablet    DELTASONE    10 tablet    Take two tablets (= 40mg) each day for 5 (five) days        * predniSONE 20 MG tablet    DELTASONE    10 tablet    Take 1 tablet (20 mg) by mouth 2 times daily for 5 days    Asthma exacerbation       vitamin D 2000 UNITS tablet      Take 2,000 Units by mouth daily        * Notice:  This list has 6 medication(s) that are the same as other medications prescribed for you. Read the directions carefully, and ask your doctor or other care provider to review them with you.

## 2017-08-25 NOTE — PROGRESS NOTES
"SUBJECTIVE: Karolina Scott is a 62 year old female presenting with a chief complaint of cough  and wheeze.  Onset of symptoms was day(s) ago.  Course of illness is worsening.    Severity moderate  Current and Associated symptoms: \"cold symptoms\"  Treatment measures tried include see med list.  Predisposing factors include HX of asthma.    Past Medical History:   Diagnosis Date     Asthma, mild persistent      GERD (gastroesophageal reflux disease)      Obesity      Seasonal allergies        Past Surgical History:   Procedure Laterality Date     CATARACT IOL, RT/LT        SECTION         No family history on file.    Social History   Substance Use Topics     Smoking status: Never Smoker     Smokeless tobacco: Not on file     Alcohol use No     Review Of Systems  Skin: negative  Eyes: negative  Ears/Nose/Throat: nasal congestion  Respiratory: Cough- and wheeze  Cardiovascular: negative  Gastrointestinal: negative  Genitourinary: negative  Musculoskeletal: negative  Neurologic: negative  Psychiatric: negative  Hematologic/Lymphatic/Immunologic: negative  Endocrine: negative      OBJECTIVE:  /63  Pulse 90  Temp 98  F (36.7  C) (Oral)  Resp 20  Wt 190 lb 9.6 oz (86.5 kg)  SpO2 96%   GENERAL APPEARANCE: healthy, alert and no distress  EYES: EOMI,  PERRL, conjunctiva clear  HENT: ear canals and TM's normal.  Nose and mouth without ulcers, erythema or lesions  NECK: supple, nontender, no lymphadenopathy  RESP: expiratory wheezes throughout  HEART RRR  SKIN: no suspicious lesions or rashes      ICD-10-CM    1. Asthma exacerbation J45.901 albuterol (2.5 MG/3ML) 0.083% neb solution     INHALATION/NEBULIZER TREATMENT, INITIAL     albuterol (2.5 MG/3ML) 0.083% neb solution     ALBUTEROL UNIT DOSE, 1 MG -      INHALATION/NEBULIZER TREATMENT, INITIAL     predniSONE (DELTASONE) 20 MG tablet     Less wheezing after neb  Cont meds and f/u PCP next week  Fluids/Rest, f/u ED if worse/not any better    "

## 2018-07-16 ENCOUNTER — OFFICE VISIT (OUTPATIENT)
Dept: URGENT CARE | Facility: URGENT CARE | Age: 63
End: 2018-07-16
Payer: COMMERCIAL

## 2018-07-16 ENCOUNTER — RADIANT APPOINTMENT (OUTPATIENT)
Dept: GENERAL RADIOLOGY | Facility: CLINIC | Age: 63
End: 2018-07-16
Attending: PHYSICIAN ASSISTANT
Payer: COMMERCIAL

## 2018-07-16 VITALS
OXYGEN SATURATION: 96 % | RESPIRATION RATE: 20 BRPM | DIASTOLIC BLOOD PRESSURE: 70 MMHG | SYSTOLIC BLOOD PRESSURE: 140 MMHG | WEIGHT: 189.5 LBS | HEART RATE: 102 BPM

## 2018-07-16 DIAGNOSIS — R09.89 CHEST CONGESTION: ICD-10-CM

## 2018-07-16 DIAGNOSIS — J45.901 MODERATE ASTHMA WITH EXACERBATION, UNSPECIFIED WHETHER PERSISTENT: ICD-10-CM

## 2018-07-16 DIAGNOSIS — R07.0 THROAT PAIN: ICD-10-CM

## 2018-07-16 DIAGNOSIS — R05.9 COUGH: ICD-10-CM

## 2018-07-16 DIAGNOSIS — J45.901 MODERATE ASTHMA WITH EXACERBATION, UNSPECIFIED WHETHER PERSISTENT: Primary | ICD-10-CM

## 2018-07-16 LAB
DEPRECATED S PYO AG THROAT QL EIA: NORMAL
SPECIMEN SOURCE: NORMAL

## 2018-07-16 PROCEDURE — 87081 CULTURE SCREEN ONLY: CPT | Performed by: PHYSICIAN ASSISTANT

## 2018-07-16 PROCEDURE — 94640 AIRWAY INHALATION TREATMENT: CPT | Performed by: PHYSICIAN ASSISTANT

## 2018-07-16 PROCEDURE — 71046 X-RAY EXAM CHEST 2 VIEWS: CPT | Mod: FY

## 2018-07-16 PROCEDURE — 99214 OFFICE O/P EST MOD 30 MIN: CPT | Mod: 25 | Performed by: PHYSICIAN ASSISTANT

## 2018-07-16 PROCEDURE — 87880 STREP A ASSAY W/OPTIC: CPT | Performed by: PHYSICIAN ASSISTANT

## 2018-07-16 RX ORDER — AZITHROMYCIN 250 MG/1
TABLET, FILM COATED ORAL
Qty: 6 TABLET | Refills: 0 | Status: SHIPPED | OUTPATIENT
Start: 2018-07-16 | End: 2019-02-25

## 2018-07-16 RX ORDER — PREDNISONE 20 MG/1
TABLET ORAL
Qty: 13 TABLET | Refills: 0 | Status: SHIPPED | OUTPATIENT
Start: 2018-07-16 | End: 2019-02-25

## 2018-07-16 NOTE — PROGRESS NOTES
SUBJECTIVE:   Karolina Scott is a 63 year old female presenting with a chief complaint of chest congestion, wheezing, sore throat.  Onset of symptoms was 5 day(s) ago.  Course of illness is worsening.    Severity moderate  Current and Associated symptoms: chest congestion, coughing, sore throat  Treatment measures tried include OTC medications.  Predisposing factors include asthma.    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     Ibuprofen          Social History   Substance Use Topics     Smoking status: Never Smoker     Smokeless tobacco: Not on file     Alcohol use No       ROS:  CONSTITUTIONAL:NEGATIVE for fever, chills, change in weight  INTEGUMENTARY/SKIN: NEGATIVE for worrisome rashes, moles or lesions  EYES: NEGATIVE for vision changes or irritation  ENT/MOUTH: POSITIVE for throat pain  RESP:POSITIVE for cough-productive, Hx asthma and wheezing  CV: NEGATIVE for chest pain, palpitations or peripheral edema  GI: NEGATIVE for nausea, abdominal pain, heartburn, or change in bowel habits  MUSCULOSKELETAL: NEGATIVE for significant arthralgias or myalgia  NEURO: NEGATIVE for weakness, dizziness or paresthesias    OBJECTIVE  :/70  Pulse 102  Resp 20  Wt 189 lb 8 oz (86 kg)  SpO2 96%  GENERAL APPEARANCE: healthy, alert and no distress  EYES: EOMI,  PERRL, conjunctiva clear  HENT: ear canals and TM's normal.  Nose and mouth without ulcers, erythema or lesions  NECK: supple, nontender, no lymphadenopathy  RESP: expiratory wheezes generalized, congestion  CV: regular rates and rhythm, normal S1 S2, no murmur noted  ABDOMEN:  soft, nontender, no HSM or masses and bowel sounds  normal  NEURO: Normal strength and tone, sensory exam grossly normal,  normal speech and mentation  SKIN: no suspicious lesions or rashes    atrovent neb given in clinic    Chest xray Negative for acute findings, read by Sage EVANGELISTA at time of visit.    Results for orders placed or performed in visit on 07/16/18   Strep, Rapid Screen   Result Value Ref Range    Specimen Description Throat     Rapid Strep A Screen       NEGATIVE: No Group A streptococcal antigen detected by immunoassay, await culture report.       ASSESSMENT/PLAN:    ICD-10-CM    1. Moderate asthma with exacerbation, unspecified whether persistent J45.901 predniSONE (DELTASONE) 20 MG tablet     azithromycin (ZITHROMAX) 250 MG tablet     XR Chest 2 Views     INHALATION/NEBULIZER TREATMENT, INITIAL     ipratropium (ATROVENT) 0.02 % neb solution   2. Cough R05 predniSONE (DELTASONE) 20 MG tablet   3. Chest congestion R09.89 predniSONE (DELTASONE) 20 MG tablet     azithromycin (ZITHROMAX) 250 MG tablet     XR Chest 2 Views     INHALATION/NEBULIZER TREATMENT, INITIAL   4. Throat pain R07.0 Strep, Rapid Screen     Beta strep group A culture       Orders Placed This Encounter     INHALATION/NEBULIZER TREATMENT, INITIAL     XR Chest 2 Views     predniSONE (DELTASONE) 20 MG tablet     azithromycin (ZITHROMAX) 250 MG tablet     ipratropium (ATROVENT) 0.02 % neb solution       Strep culture pending  Albuterol and advair  Start prednisone  Follow up with PCP as needed  Go to the ED if symptoms worsen  See orders in Epic

## 2018-07-16 NOTE — MR AVS SNAPSHOT
"              After Visit Summary   2018    Karolina Scott    MRN: [id number]           Patient Information     Date Of Birth          1955        Visit Information        Provider Department      2018 12:55 PM Sage Murphy PA-C [facility]        Today's Diagnoses     Moderate asthma with exacerbation, unspecified whether persistent    -  1    Cough        Chest congestion        Throat pain           Follow-ups after your visit        Who to contact     If you have questions or need follow up information about today's clinic visit or your schedule please contact [facility] directly at [phone].  Normal or non-critical lab and imaging results will be communicated to you by [employer], letter or phone within 4 business days after the clinic has received the results. If you do not hear from us within 7 days, please contact the clinic through [employer] or phone. If you have a critical or abnormal lab result, we will notify you by phone as soon as possible.  Submit refill requests through [facility] or call your pharmacy and they will forward the refill request to us. Please allow 3 business days for your refill to be completed.          Additional Information About Your Visit        MyChart Information     [facility] lets you send messages to your doctor, view your test results, renew your prescriptions, schedule appointments and more. To sign up, go to [email or web address] . Click on \"Log in\" on the left side of the screen, which will take you to the Welcome page. Then click on \"Sign up Now\" on the right side of the page.     You will be asked to enter the access code listed below, as well as some personal information. Please follow the directions to create your username and password.     Your access code is: [email or web address]  Expires: 10/14/2018  2:25 PM     Your access code will  in 90 days. If you need help or a new code, please call your " [facility] or [phone].        Care EveryWhere ID     This is your Care EveryWhere ID. This could be used by other organizations to access your [facility] medical records  [id number]        Your Vitals Were     Pulse Respirations Pulse Oximetry             102 20 96%          Blood Pressure from Last 3 Encounters:   07/16/18 140/70   08/25/17 143/63   04/14/17 142/73    Weight from Last 3 Encounters:   07/16/18 189 lb 8 oz (86 kg)   08/25/17 190 lb 9.6 oz (86.5 kg)   03/20/17 194 lb (88 kg)              We Performed the Following     Beta strep group A culture     INHALATION/NEBULIZER TREATMENT, INITIAL     Strep, Rapid Screen          Today's Medication Changes          These changes are accurate as of 7/16/18  2:25 PM.  If you have any questions, ask your nurse or doctor.               Start taking these medicines.        Dose/Directions    azithromycin 250 MG tablet   Commonly known as:  ZITHROMAX   Used for:  Moderate asthma with exacerbation, unspecified whether persistent, Chest congestion   Started by:  Sage Murphy PA-C        2 tabs po qd day 1, then 1 tab po qd days 2-5   Quantity:  6 tablet   Refills:  0       ipratropium 0.02 % neb solution   Commonly known as:  ATROVENT   Used for:  Moderate asthma with exacerbation, unspecified whether persistent   Started by:  Sage Murphy PA-C        Dose:  0.5 mg   Take 2.5 mLs (0.5 mg) by nebulization once for 1 dose   Quantity:  2.5 mL   Refills:  0         These medicines have changed or have updated prescriptions.        Dose/Directions    * predniSONE 20 MG tablet   Commonly known as:  DELTASONE   This may have changed:  Another medication with the same name was added. Make sure you understand how and when to take each.   Changed by:  Sage Murphy PA-C        Take two tablets (= 40mg) each day for 5 (five) days   Quantity:  10 tablet   Refills:  0       * predniSONE 20 MG tablet   Commonly known as:  DELTASONE   This may have changed:  You  were already taking a medication with the same name, and this prescription was added. Make sure you understand how and when to take each.   Used for:  Moderate asthma with exacerbation, unspecified whether persistent, Cough, Chest congestion   Changed by:  Sage Murphy PA-C        1 tab po bid for 5 days, then 1 tab po qd for 2 days then 1/2 tab po qd for 2 days   Quantity:  13 tablet   Refills:  0       * Notice:  This list has 2 medication(s) that are the same as other medications prescribed for you. Read the directions carefully, and ask your doctor or other care provider to review them with you.         Where to get your medicines      These medications were sent to [facility] [address] [address]     Phone:  [phone]     azithromycin 250 MG tablet    predniSONE 20 MG tablet         Some of these will need a paper prescription and others can be bought over the counter.  Ask your nurse if you have questions.     You don't need a prescription for these medications     ipratropium 0.02 % neb solution                Primary Care Provider Office Phone # Fax #    Narinder Baez [phone]       [facility] [address] [place] [address]        Equal Access to Services     SHABANA MARIN AH: Hadii elfego ford hadasho Soomaali, waaxda luqadaha, qaybta kaalmada adeegyada, arden ho. So [facility] [phone].    ATENCIÓN: Si habla español, tiene a tompkins disposición servicios gratuitos de asistencia lingüística. Llame al [phone].    We comply with applicable federal civil rights laws and [place] laws. We do not discriminate on the basis of race, color, national origin, age, disability, sex, sexual orientation, or gender identity.            Thank you!     Thank you for choosing [facility]  for your care. Our goal is always to provide you with excellent  care. Hearing back from our patients is one way we can continue to improve our services. Please take a few minutes to complete the written survey that you may receive in the mail after your visit with us. Thank you!             Your Updated Medication List - Protect others around you: Learn how to safely use, store and throw away your medicines at www.disposemymeds.org.          This list is accurate as of 7/16/18  2:25 PM.  Always use your most recent med list.                   Brand Name Dispense Instructions for use Diagnosis    * albuterol 108 (90 Base) MCG/ACT Inhaler    PROAIR HFA/PROVENTIL HFA/VENTOLIN HFA     Inhale 2 puffs into the lungs daily        * albuterol (2.5 MG/3ML) 0.083% neb solution     360 mL    Take 1 vial (2.5 mg) by nebulization every 4 hours as needed for shortness of breath / dyspnea or wheezing        azithromycin 250 MG tablet    ZITHROMAX    6 tablet    2 tabs po qd day 1, then 1 tab po qd days 2-5    Moderate asthma with exacerbation, unspecified whether persistent, Chest congestion       budesonide-formoterol 160-4.5 MCG/ACT Inhaler    SYMBICORT     Inhale 2 puffs into the lungs 2 times daily        cetirizine 10 MG tablet    zyrTEC     Take 10 mg by mouth daily        fluticasone 50 MCG/ACT spray    FLONASE     2 sprays        ipratropium 0.02 % neb solution    ATROVENT    2.5 mL    Take 2.5 mLs (0.5 mg) by nebulization once for 1 dose    Moderate asthma with exacerbation, unspecified whether persistent       loratadine 10 MG tablet    CLARITIN     Take 10 mg by mouth daily        mometasone 110 MCG/INH inhaler    ASMANEX     Inhale 1 puff into the lungs every evening        montelukast 10 MG tablet    SINGULAIR     Take 10 mg by mouth At Bedtime        omeprazole 20 MG CR capsule    priLOSEC     Take 20 mg by mouth daily with food        * predniSONE 20 MG tablet    DELTASONE    10 tablet    Take two tablets (= 40mg) each day for 5 (five) days        * predniSONE 20 MG tablet     DELTASONE    13 tablet    1 tab po bid for 5 days, then 1 tab po qd for 2 days then 1/2 tab po qd for 2 days    Moderate asthma with exacerbation, unspecified whether persistent, Cough, Chest congestion       vitamin D 2000 units tablet      Take 2,000 Units by mouth daily        * Notice:  This list has 4 medication(s) that are the same as other medications prescribed for you. Read the directions carefully, and ask your doctor or other care provider to review them with you.

## 2018-07-17 LAB
BACTERIA SPEC CULT: NORMAL
SPECIMEN SOURCE: NORMAL

## 2019-02-25 ENCOUNTER — HOSPITAL ENCOUNTER (OUTPATIENT)
Facility: CLINIC | Age: 64
Setting detail: OBSERVATION
Discharge: HOME | End: 2019-02-27
Attending: EMERGENCY MEDICINE | Admitting: HOSPITALIST
Payer: COMMERCIAL

## 2019-02-25 ENCOUNTER — APPOINTMENT (OUTPATIENT)
Dept: GENERAL RADIOLOGY | Facility: CLINIC | Age: 64
End: 2019-02-25
Attending: EMERGENCY MEDICINE
Payer: COMMERCIAL

## 2019-02-25 DIAGNOSIS — R06.02 SOB (SHORTNESS OF BREATH): ICD-10-CM

## 2019-02-25 DIAGNOSIS — J45.901 EXACERBATION OF ASTHMA, UNSPECIFIED ASTHMA SEVERITY, UNSPECIFIED WHETHER PERSISTENT: Primary | ICD-10-CM

## 2019-02-25 DIAGNOSIS — R79.89 ELEVATED TROPONIN: ICD-10-CM

## 2019-02-25 DIAGNOSIS — R07.9 CHEST PAIN, UNSPECIFIED TYPE: ICD-10-CM

## 2019-02-25 LAB
ANION GAP SERPL CALCULATED.3IONS-SCNC: 8 MMOL/L (ref 3–14)
BASOPHILS # BLD AUTO: 0 10E9/L (ref 0–0.2)
BASOPHILS NFR BLD AUTO: 0.4 %
BUN SERPL-MCNC: 8 MG/DL (ref 7–30)
CALCIUM SERPL-MCNC: 8.5 MG/DL (ref 8.5–10.1)
CHLORIDE SERPL-SCNC: 105 MMOL/L (ref 94–109)
CO2 SERPL-SCNC: 26 MMOL/L (ref 20–32)
CREAT SERPL-MCNC: 0.64 MG/DL (ref 0.52–1.04)
D DIMER PPP FEU-MCNC: 0.5 UG/ML FEU (ref 0–0.5)
DIFFERENTIAL METHOD BLD: ABNORMAL
EOSINOPHIL # BLD AUTO: 0.5 10E9/L (ref 0–0.7)
EOSINOPHIL NFR BLD AUTO: 5.6 %
ERYTHROCYTE [DISTWIDTH] IN BLOOD BY AUTOMATED COUNT: 14.4 % (ref 10–15)
FLUAV+FLUBV AG SPEC QL: NEGATIVE
FLUAV+FLUBV AG SPEC QL: NEGATIVE
GFR SERPL CREATININE-BSD FRML MDRD: >90 ML/MIN/{1.73_M2}
GLUCOSE SERPL-MCNC: 136 MG/DL (ref 70–99)
HCT VFR BLD AUTO: 34.5 % (ref 35–47)
HGB BLD-MCNC: 11.3 G/DL (ref 11.7–15.7)
IMM GRANULOCYTES # BLD: 0 10E9/L (ref 0–0.4)
IMM GRANULOCYTES NFR BLD: 0.4 %
LYMPHOCYTES # BLD AUTO: 2.9 10E9/L (ref 0.8–5.3)
LYMPHOCYTES NFR BLD AUTO: 33.8 %
MCH RBC QN AUTO: 28.1 PG (ref 26.5–33)
MCHC RBC AUTO-ENTMCNC: 32.8 G/DL (ref 31.5–36.5)
MCV RBC AUTO: 86 FL (ref 78–100)
MONOCYTES # BLD AUTO: 0.6 10E9/L (ref 0–1.3)
MONOCYTES NFR BLD AUTO: 7.2 %
NEUTROPHILS # BLD AUTO: 4.4 10E9/L (ref 1.6–8.3)
NEUTROPHILS NFR BLD AUTO: 52.6 %
NRBC # BLD AUTO: 0 10*3/UL
NRBC BLD AUTO-RTO: 0 /100
PLATELET # BLD AUTO: 205 10E9/L (ref 150–450)
POTASSIUM SERPL-SCNC: 3.4 MMOL/L (ref 3.4–5.3)
RBC # BLD AUTO: 4.02 10E12/L (ref 3.8–5.2)
SODIUM SERPL-SCNC: 139 MMOL/L (ref 133–144)
SPECIMEN SOURCE: NORMAL
TROPONIN I SERPL-MCNC: 0.07 UG/L (ref 0–0.04)
WBC # BLD AUTO: 8.5 10E9/L (ref 4–11)

## 2019-02-25 PROCEDURE — 80048 BASIC METABOLIC PNL TOTAL CA: CPT | Performed by: EMERGENCY MEDICINE

## 2019-02-25 PROCEDURE — 87804 INFLUENZA ASSAY W/OPTIC: CPT | Performed by: EMERGENCY MEDICINE

## 2019-02-25 PROCEDURE — 85025 COMPLETE CBC W/AUTO DIFF WBC: CPT | Performed by: EMERGENCY MEDICINE

## 2019-02-25 PROCEDURE — 25000125 ZZHC RX 250

## 2019-02-25 PROCEDURE — 84484 ASSAY OF TROPONIN QUANT: CPT | Performed by: EMERGENCY MEDICINE

## 2019-02-25 PROCEDURE — 71046 X-RAY EXAM CHEST 2 VIEWS: CPT

## 2019-02-25 PROCEDURE — 25000128 H RX IP 250 OP 636: Performed by: EMERGENCY MEDICINE

## 2019-02-25 PROCEDURE — 99285 EMERGENCY DEPT VISIT HI MDM: CPT | Mod: 25

## 2019-02-25 PROCEDURE — 99219 ZZC INITIAL OBSERVATION CARE,LEVL II: CPT | Performed by: HOSPITALIST

## 2019-02-25 PROCEDURE — 85379 FIBRIN DEGRADATION QUANT: CPT | Performed by: EMERGENCY MEDICINE

## 2019-02-25 PROCEDURE — 94640 AIRWAY INHALATION TREATMENT: CPT

## 2019-02-25 PROCEDURE — 99207 ZZC CDG-MDM COMPONENT: MEETS LOW - DOWN CODED: CPT | Performed by: HOSPITALIST

## 2019-02-25 RX ORDER — ONDANSETRON 4 MG/1
4 TABLET, ORALLY DISINTEGRATING ORAL EVERY 6 HOURS PRN
Status: DISCONTINUED | OUTPATIENT
Start: 2019-02-25 | End: 2019-02-27 | Stop reason: HOSPADM

## 2019-02-25 RX ORDER — PROCHLORPERAZINE 25 MG
25 SUPPOSITORY, RECTAL RECTAL EVERY 12 HOURS PRN
Status: DISCONTINUED | OUTPATIENT
Start: 2019-02-25 | End: 2019-02-27 | Stop reason: HOSPADM

## 2019-02-25 RX ORDER — NALOXONE HYDROCHLORIDE 0.4 MG/ML
.1-.4 INJECTION, SOLUTION INTRAMUSCULAR; INTRAVENOUS; SUBCUTANEOUS
Status: DISCONTINUED | OUTPATIENT
Start: 2019-02-25 | End: 2019-02-27

## 2019-02-25 RX ORDER — ACETAMINOPHEN 325 MG/1
650 TABLET ORAL EVERY 4 HOURS PRN
Status: DISCONTINUED | OUTPATIENT
Start: 2019-02-25 | End: 2019-02-27 | Stop reason: HOSPADM

## 2019-02-25 RX ORDER — ACETAMINOPHEN 650 MG/1
650 SUPPOSITORY RECTAL EVERY 4 HOURS PRN
Status: DISCONTINUED | OUTPATIENT
Start: 2019-02-25 | End: 2019-02-27

## 2019-02-25 RX ORDER — BISACODYL 10 MG
10 SUPPOSITORY, RECTAL RECTAL DAILY PRN
Status: DISCONTINUED | OUTPATIENT
Start: 2019-02-25 | End: 2019-02-27 | Stop reason: HOSPADM

## 2019-02-25 RX ORDER — AMOXICILLIN 250 MG
1 CAPSULE ORAL 2 TIMES DAILY PRN
Status: DISCONTINUED | OUTPATIENT
Start: 2019-02-25 | End: 2019-02-27 | Stop reason: HOSPADM

## 2019-02-25 RX ORDER — PROCHLORPERAZINE MALEATE 10 MG
10 TABLET ORAL EVERY 6 HOURS PRN
Status: DISCONTINUED | OUTPATIENT
Start: 2019-02-25 | End: 2019-02-27 | Stop reason: HOSPADM

## 2019-02-25 RX ORDER — ONDANSETRON 2 MG/ML
4 INJECTION INTRAMUSCULAR; INTRAVENOUS EVERY 6 HOURS PRN
Status: DISCONTINUED | OUTPATIENT
Start: 2019-02-25 | End: 2019-02-27 | Stop reason: HOSPADM

## 2019-02-25 RX ORDER — AMOXICILLIN 250 MG
2 CAPSULE ORAL 2 TIMES DAILY PRN
Status: DISCONTINUED | OUTPATIENT
Start: 2019-02-25 | End: 2019-02-27 | Stop reason: HOSPADM

## 2019-02-25 RX ORDER — IPRATROPIUM BROMIDE AND ALBUTEROL SULFATE 2.5; .5 MG/3ML; MG/3ML
SOLUTION RESPIRATORY (INHALATION)
Status: COMPLETED
Start: 2019-02-25 | End: 2019-02-25

## 2019-02-25 RX ORDER — LEVALBUTEROL 1.25 MG/.5ML
1.25 SOLUTION, CONCENTRATE RESPIRATORY (INHALATION) EVERY 6 HOURS PRN
Status: DISCONTINUED | OUTPATIENT
Start: 2019-02-25 | End: 2019-02-27 | Stop reason: HOSPADM

## 2019-02-25 RX ORDER — ALBUTEROL SULFATE 90 UG/1
2 AEROSOL, METERED RESPIRATORY (INHALATION) EVERY 6 HOURS PRN
COMMUNITY
End: 2019-08-15

## 2019-02-25 RX ADMIN — SODIUM CHLORIDE 1000 ML: 9 INJECTION, SOLUTION INTRAVENOUS at 23:24

## 2019-02-25 RX ADMIN — IPRATROPIUM BROMIDE AND ALBUTEROL SULFATE 3 ML: .5; 2.5 SOLUTION RESPIRATORY (INHALATION) at 20:53

## 2019-02-25 ASSESSMENT — ENCOUNTER SYMPTOMS
SHORTNESS OF BREATH: 1
COUGH: 1
FEVER: 0
SORE THROAT: 0
RHINORRHEA: 1
CHILLS: 1

## 2019-02-25 NOTE — Clinical Note
6 Fr Angio-Seal utilized for closure of sight.  Manual pressure applied by scrub person; hemostasis achieved.

## 2019-02-26 ENCOUNTER — APPOINTMENT (OUTPATIENT)
Dept: CARDIOLOGY | Facility: CLINIC | Age: 64
End: 2019-02-26
Attending: HOSPITALIST
Payer: COMMERCIAL

## 2019-02-26 LAB
CHOLEST SERPL-MCNC: 173 MG/DL
HDLC SERPL-MCNC: 64 MG/DL
INTERPRETATION ECG - MUSE: NORMAL
LDLC SERPL CALC-MCNC: 96 MG/DL
NONHDLC SERPL-MCNC: 109 MG/DL
POTASSIUM SERPL-SCNC: 3.7 MMOL/L (ref 3.4–5.3)
TRIGL SERPL-MCNC: 63 MG/DL
TROPONIN I SERPL-MCNC: 0.42 UG/L (ref 0–0.04)
TROPONIN I SERPL-MCNC: 0.55 UG/L (ref 0–0.04)

## 2019-02-26 PROCEDURE — 25000125 ZZHC RX 250: Performed by: HOSPITALIST

## 2019-02-26 PROCEDURE — 80061 LIPID PANEL: CPT | Performed by: HOSPITALIST

## 2019-02-26 PROCEDURE — 25000128 H RX IP 250 OP 636: Performed by: INTERNAL MEDICINE

## 2019-02-26 PROCEDURE — 84132 ASSAY OF SERUM POTASSIUM: CPT | Performed by: INTERNAL MEDICINE

## 2019-02-26 PROCEDURE — 40000264 ECHOCARDIOGRAM COMPLETE

## 2019-02-26 PROCEDURE — 99152 MOD SED SAME PHYS/QHP 5/>YRS: CPT | Mod: GC | Performed by: INTERNAL MEDICINE

## 2019-02-26 PROCEDURE — 93454 CORONARY ARTERY ANGIO S&I: CPT | Performed by: INTERNAL MEDICINE

## 2019-02-26 PROCEDURE — 25500064 ZZH RX 255 OP 636: Performed by: HOSPITALIST

## 2019-02-26 PROCEDURE — 40000852 ZZH STATISTIC HEART CATH LAB OR EP LAB

## 2019-02-26 PROCEDURE — 94640 AIRWAY INHALATION TREATMENT: CPT

## 2019-02-26 PROCEDURE — 36415 COLL VENOUS BLD VENIPUNCTURE: CPT | Performed by: INTERNAL MEDICINE

## 2019-02-26 PROCEDURE — 84484 ASSAY OF TROPONIN QUANT: CPT | Performed by: HOSPITALIST

## 2019-02-26 PROCEDURE — 40000235 ZZH STATISTIC TELEMETRY

## 2019-02-26 PROCEDURE — 96376 TX/PRO/DX INJ SAME DRUG ADON: CPT | Mod: 59

## 2019-02-26 PROCEDURE — 93454 CORONARY ARTERY ANGIO S&I: CPT | Mod: 26 | Performed by: INTERNAL MEDICINE

## 2019-02-26 PROCEDURE — 93306 TTE W/DOPPLER COMPLETE: CPT | Mod: 26 | Performed by: INTERNAL MEDICINE

## 2019-02-26 PROCEDURE — 25000132 ZZH RX MED GY IP 250 OP 250 PS 637: Performed by: INTERNAL MEDICINE

## 2019-02-26 PROCEDURE — G0378 HOSPITAL OBSERVATION PER HR: HCPCS

## 2019-02-26 PROCEDURE — C1760 CLOSURE DEV, VASC: HCPCS | Performed by: INTERNAL MEDICINE

## 2019-02-26 PROCEDURE — 96374 THER/PROPH/DIAG INJ IV PUSH: CPT | Mod: 59

## 2019-02-26 PROCEDURE — 25000132 ZZH RX MED GY IP 250 OP 250 PS 637: Performed by: HOSPITALIST

## 2019-02-26 PROCEDURE — 25800030 ZZH RX IP 258 OP 636: Performed by: INTERNAL MEDICINE

## 2019-02-26 PROCEDURE — 99152 MOD SED SAME PHYS/QHP 5/>YRS: CPT | Performed by: INTERNAL MEDICINE

## 2019-02-26 PROCEDURE — 40000275 ZZH STATISTIC RCP TIME EA 10 MIN

## 2019-02-26 PROCEDURE — 99153 MOD SED SAME PHYS/QHP EA: CPT | Performed by: INTERNAL MEDICINE

## 2019-02-26 PROCEDURE — 25000125 ZZHC RX 250: Performed by: INTERNAL MEDICINE

## 2019-02-26 PROCEDURE — 36415 COLL VENOUS BLD VENIPUNCTURE: CPT | Performed by: HOSPITALIST

## 2019-02-26 PROCEDURE — 94640 AIRWAY INHALATION TREATMENT: CPT | Mod: 76

## 2019-02-26 PROCEDURE — 99204 OFFICE O/P NEW MOD 45 MIN: CPT | Performed by: INTERNAL MEDICINE

## 2019-02-26 PROCEDURE — 99226 ZZC SUBSEQUENT OBSERVATION CARE,LEVEL III: CPT | Performed by: INTERNAL MEDICINE

## 2019-02-26 PROCEDURE — 99207 ZZC CDG-CODE CATEGORY CHANGED: CPT | Performed by: INTERNAL MEDICINE

## 2019-02-26 PROCEDURE — 27210794 ZZH OR GENERAL SUPPLY STERILE: Performed by: INTERNAL MEDICINE

## 2019-02-26 PROCEDURE — C1894 INTRO/SHEATH, NON-LASER: HCPCS | Performed by: INTERNAL MEDICINE

## 2019-02-26 RX ORDER — NALOXONE HYDROCHLORIDE 0.4 MG/ML
.1-.4 INJECTION, SOLUTION INTRAMUSCULAR; INTRAVENOUS; SUBCUTANEOUS
Status: DISCONTINUED | OUTPATIENT
Start: 2019-02-26 | End: 2019-02-27 | Stop reason: HOSPADM

## 2019-02-26 RX ORDER — ACETAMINOPHEN 325 MG/1
325-650 TABLET ORAL EVERY 4 HOURS PRN
Status: DISCONTINUED | OUTPATIENT
Start: 2019-02-26 | End: 2019-02-27 | Stop reason: HOSPADM

## 2019-02-26 RX ORDER — HYDROCODONE BITARTRATE AND ACETAMINOPHEN 5; 325 MG/1; MG/1
1-2 TABLET ORAL EVERY 4 HOURS PRN
Status: DISCONTINUED | OUTPATIENT
Start: 2019-02-26 | End: 2019-02-27 | Stop reason: HOSPADM

## 2019-02-26 RX ORDER — LIDOCAINE 40 MG/G
CREAM TOPICAL
Status: DISCONTINUED | OUTPATIENT
Start: 2019-02-26 | End: 2019-02-26 | Stop reason: HOSPADM

## 2019-02-26 RX ORDER — SODIUM CHLORIDE 9 MG/ML
INJECTION, SOLUTION INTRAVENOUS CONTINUOUS
Status: DISCONTINUED | OUTPATIENT
Start: 2019-02-26 | End: 2019-02-26 | Stop reason: HOSPADM

## 2019-02-26 RX ORDER — FENTANYL CITRATE 50 UG/ML
INJECTION, SOLUTION INTRAMUSCULAR; INTRAVENOUS
Status: DISCONTINUED | OUTPATIENT
Start: 2019-02-26 | End: 2019-02-26 | Stop reason: HOSPADM

## 2019-02-26 RX ORDER — CLOPIDOGREL BISULFATE 75 MG/1
75 TABLET ORAL DAILY
Status: DISCONTINUED | OUTPATIENT
Start: 2019-02-26 | End: 2019-02-27 | Stop reason: HOSPADM

## 2019-02-26 RX ORDER — ASPIRIN 81 MG/1
81 TABLET ORAL DAILY
Status: DISCONTINUED | OUTPATIENT
Start: 2019-02-27 | End: 2019-02-27 | Stop reason: HOSPADM

## 2019-02-26 RX ORDER — LORAZEPAM 2 MG/ML
0.5 INJECTION INTRAMUSCULAR
Status: DISCONTINUED | OUTPATIENT
Start: 2019-02-26 | End: 2019-02-26 | Stop reason: HOSPADM

## 2019-02-26 RX ORDER — LORAZEPAM 0.5 MG/1
0.5 TABLET ORAL
Status: DISCONTINUED | OUTPATIENT
Start: 2019-02-26 | End: 2019-02-26 | Stop reason: HOSPADM

## 2019-02-26 RX ORDER — NALOXONE HYDROCHLORIDE 0.4 MG/ML
.2-.4 INJECTION, SOLUTION INTRAMUSCULAR; INTRAVENOUS; SUBCUTANEOUS
Status: ACTIVE | OUTPATIENT
Start: 2019-02-26 | End: 2019-02-27

## 2019-02-26 RX ORDER — POTASSIUM CHLORIDE 1500 MG/1
20 TABLET, EXTENDED RELEASE ORAL
Status: DISCONTINUED | OUTPATIENT
Start: 2019-02-26 | End: 2019-02-26 | Stop reason: HOSPADM

## 2019-02-26 RX ORDER — POTASSIUM CHLORIDE 1500 MG/1
20 TABLET, EXTENDED RELEASE ORAL
Status: COMPLETED | OUTPATIENT
Start: 2019-02-26 | End: 2019-02-26

## 2019-02-26 RX ORDER — SODIUM CHLORIDE 9 MG/ML
INJECTION, SOLUTION INTRAVENOUS CONTINUOUS
Status: DISCONTINUED | OUTPATIENT
Start: 2019-02-26 | End: 2019-02-27 | Stop reason: HOSPADM

## 2019-02-26 RX ORDER — METHYLPREDNISOLONE SODIUM SUCCINATE 40 MG/ML
40 INJECTION, POWDER, LYOPHILIZED, FOR SOLUTION INTRAMUSCULAR; INTRAVENOUS EVERY 6 HOURS
Status: DISCONTINUED | OUTPATIENT
Start: 2019-02-26 | End: 2019-02-27 | Stop reason: HOSPADM

## 2019-02-26 RX ORDER — FENTANYL CITRATE 50 UG/ML
25-50 INJECTION, SOLUTION INTRAMUSCULAR; INTRAVENOUS
Status: ACTIVE | OUTPATIENT
Start: 2019-02-26 | End: 2019-02-27

## 2019-02-26 RX ORDER — IPRATROPIUM BROMIDE AND ALBUTEROL SULFATE 2.5; .5 MG/3ML; MG/3ML
3 SOLUTION RESPIRATORY (INHALATION) EVERY 4 HOURS
Status: DISCONTINUED | OUTPATIENT
Start: 2019-02-26 | End: 2019-02-27 | Stop reason: HOSPADM

## 2019-02-26 RX ORDER — ASPIRIN 325 MG
325 TABLET ORAL DAILY
Status: DISCONTINUED | OUTPATIENT
Start: 2019-02-26 | End: 2019-02-26

## 2019-02-26 RX ORDER — IOPAMIDOL 755 MG/ML
INJECTION, SOLUTION INTRAVASCULAR
Status: DISCONTINUED | OUTPATIENT
Start: 2019-02-26 | End: 2019-02-26 | Stop reason: HOSPADM

## 2019-02-26 RX ORDER — LIDOCAINE 40 MG/G
CREAM TOPICAL
Status: DISCONTINUED | OUTPATIENT
Start: 2019-02-26 | End: 2019-02-27 | Stop reason: HOSPADM

## 2019-02-26 RX ORDER — PANTOPRAZOLE SODIUM 40 MG/1
40 TABLET, DELAYED RELEASE ORAL
Status: DISCONTINUED | OUTPATIENT
Start: 2019-02-26 | End: 2019-02-27 | Stop reason: HOSPADM

## 2019-02-26 RX ORDER — FLUMAZENIL 0.1 MG/ML
0.2 INJECTION, SOLUTION INTRAVENOUS
Status: ACTIVE | OUTPATIENT
Start: 2019-02-26 | End: 2019-02-27

## 2019-02-26 RX ORDER — ATROPINE SULFATE 0.1 MG/ML
0.5 INJECTION INTRAVENOUS EVERY 5 MIN PRN
Status: ACTIVE | OUTPATIENT
Start: 2019-02-26 | End: 2019-02-27

## 2019-02-26 RX ADMIN — METHYLPREDNISOLONE SODIUM SUCCINATE 40 MG: 40 INJECTION, POWDER, FOR SOLUTION INTRAMUSCULAR; INTRAVENOUS at 11:42

## 2019-02-26 RX ADMIN — LEVALBUTEROL HYDROCHLORIDE 1.25 MG: 1.25 SOLUTION, CONCENTRATE RESPIRATORY (INHALATION) at 00:23

## 2019-02-26 RX ADMIN — CLOPIDOGREL BISULFATE 75 MG: 75 TABLET, FILM COATED ORAL at 11:42

## 2019-02-26 RX ADMIN — POTASSIUM CHLORIDE 20 MEQ: 1500 TABLET, EXTENDED RELEASE ORAL at 13:01

## 2019-02-26 RX ADMIN — HUMAN ALBUMIN MICROSPHERES AND PERFLUTREN 9 ML: 10; .22 INJECTION, SOLUTION INTRAVENOUS at 10:20

## 2019-02-26 RX ADMIN — PANTOPRAZOLE SODIUM 40 MG: 40 TABLET, DELAYED RELEASE ORAL at 11:42

## 2019-02-26 RX ADMIN — IPRATROPIUM BROMIDE AND ALBUTEROL SULFATE 3 ML: .5; 2.5 SOLUTION RESPIRATORY (INHALATION) at 12:33

## 2019-02-26 RX ADMIN — ASPIRIN 325 MG ORAL TABLET 325 MG: 325 PILL ORAL at 09:00

## 2019-02-26 RX ADMIN — METHYLPREDNISOLONE SODIUM SUCCINATE 40 MG: 40 INJECTION, POWDER, FOR SOLUTION INTRAMUSCULAR; INTRAVENOUS at 18:40

## 2019-02-26 RX ADMIN — IPRATROPIUM BROMIDE AND ALBUTEROL SULFATE 3 ML: .5; 2.5 SOLUTION RESPIRATORY (INHALATION) at 19:14

## 2019-02-26 RX ADMIN — IPRATROPIUM BROMIDE AND ALBUTEROL SULFATE 3 ML: .5; 2.5 SOLUTION RESPIRATORY (INHALATION) at 23:15

## 2019-02-26 RX ADMIN — OMEPRAZOLE 20 MG: 20 CAPSULE, DELAYED RELEASE ORAL at 06:42

## 2019-02-26 RX ADMIN — SODIUM CHLORIDE: 9 INJECTION, SOLUTION INTRAVENOUS at 13:03

## 2019-02-26 RX ADMIN — IPRATROPIUM BROMIDE AND ALBUTEROL SULFATE 3 ML: .5; 2.5 SOLUTION RESPIRATORY (INHALATION) at 08:20

## 2019-02-26 NOTE — PROGRESS NOTES
[facility]    Hospitalist Progress Note    Brief Summary:   Ms. Karolina Scott is a 64-year-old female with asthma, GERD, seasonal allergies who presented to the ER today with complaints of shortness of breath and found to have elevated troponin and subsequently get admitted.    Assessment & Plan       1.  Acute exacerbation of asthma:  Continue to be short of breath and wheezing bilaterally and diffusely mild cough and mild chest pain as well  At this time I will started on DuoNeb nebulization every 4-hour, started on IV Solu-Medrol 40 mg every 6 hours RT to assess and treat we will see how she does.    2.  Acute non-ST elevation MI: Patient has some mild discomfort to the chest, which is somewhat reproducible as well.  She has serial troponins which trended up and peaked 0.547 now trending down.  Echocardiogram is ordered.  A dose of aspirin, consult cardiology to evaluate her she probably will need the angiogram as well.  We will hold onto the beta-blocker at this time as she is on asthma exacerbation this time I will check her lipid panel as well.    3.  GERD.  Keep her on PPI while she is in the hospital.      4.  Seasonal allergies.    Stable..     DVT Prophylaxis: Pneumatic Compression Devices  Code Status: Full Code      Started on IV Solu-Medrol 40 mg every 6 hours  Tarted on DuoNeb every 4-6 hours  Give aspirin 325 mg p.o. this morning  Consult cardiology    Discussed with the nursing staff taking care of the patient.    Disposition: Expected discharge in 1-2 days once stable.    Dimitrios Varma MD  Text Page  (7am - 6pm)    Interval History   Patient complaint of breath this morning, slight chest pain as well.  No fever chills headache dizziness lightheadedness no abdominal pain back pain dysuria hematuria constipation diarrhea no history of any recent bleeding.    No other significant event overnight    -Data reviewed today: I reviewed all new labs and imaging results over the last 24 hours.  I personally reviewed no images or EKG's today.    Physical Exam   Temp: 97.3  F (36.3  C) Temp src: Oral BP: 129/59 Pulse: 91   Resp: 18 SpO2: 98 % O2 Device: None (Room air)    Vitals:    02/26/19 0000   Weight: 90.6 kg (199 lb 11.2 oz)     Vital Signs with Ranges  Temp:  [97.3  F (36.3  C)-98.4  F (36.9  C)] 97.3  F (36.3  C)  Pulse:  [] 91  Resp:  [16-20] 18  BP: (129-158)/(59-89) 129/59  SpO2:  [98 %-99 %] 98 %  No intake/output data recorded.    Constitutional: awake, alert, cooperative, no apparent distress, and appears stated age  Eyes: Lids and lashes normal, pupils equal, round and reactive to light, extra ocular muscles intact, sclera clear, conjunctiva normal  Respiratory: wheeze diffuse  Cardiovascular: Normal apical impulse, regular rate and rhythm, normal S1 and S2, no S3 or S4, and no murmur noted  GI: No scars, normal bowel sounds, soft, non-distended, non-tender, no masses palpated, no hepatosplenomegally  Skin: no bruising or bleeding  Musculoskeletal: no lower extremity pitting edema present  Neurologic: No focal deficit    Medications       clopidogrel  75 mg Oral Daily     fluticasone-salmeterol  1 puff Inhalation BID     ipratropium - albuterol 0.5 mg/2.5 mg/3 mL  3 mL Nebulization Q4H     methylPREDNISolone  40 mg Intravenous Q6H     omeprazole  20 mg Oral QAM AC       Data   Recent Labs   Lab 02/26/19  0622 02/26/19  0220 02/25/19 2112   WBC  --   --  8.5   HGB  --   --  11.3*   MCV  --   --  86   PLT  --   --  205   NA  --   --  139   POTASSIUM  --   --  3.4   CHLORIDE  --   --  105   CO2  --   --  26   BUN  --   --  8   CR  --   --  0.64   ANIONGAP  --   --  8   JOSE  --   --  8.5   GLC  --   --  136*   TROPI 0.425* 0.547* 0.071*       Recent Results (from the past 24 hour(s))   XR Chest 2 Views    Narrative    XR CHEST TWO VIEWS   2/25/2019 9:38 PM     HISTORY: Dyspnea.    COMPARISON: Chest x-ray 7/16/2018.      Impression    IMPRESSION: PA and lateral views of the chest. Lungs are  clear. Heart  is normal in size. No effusions are evident. No pneumothorax.    RENA LAYNE MD

## 2019-02-26 NOTE — PROGRESS NOTES
RECEIVING UNIT ED HANDOFF REVIEW    ED Nurse Handoff Report was reviewed by: Chika Shea on February 25, 2019 at 11:33 PM

## 2019-02-26 NOTE — PROVIDER NOTIFICATION
MD Notification    Notified Person: MD    Notified Person Name: Kojo    Notification Date/Time: 2/26/19    Notification Interaction: paged    Purpose of Notification: inspiratory & expiratory wheezing in lung & upper airway wheezing. C/o SOB. Could she have another neb ordered? Thanks    Orders Received: scheduled neb ordered q4h    Comments:plan to give scheduled advair once filled    ADDENDUM: LS improved after neb, diminished throughout. Mild upper airway wheezing.

## 2019-02-26 NOTE — H&P
Admitted:     02/25/2019      PRIMARY CARE PHYSICIAN:  Dr. Narinder Baez.      CHIEF COMPLAINT:  Shortness of breath.      HISTORY OF PRESENT ILLNESS:  This history might be slightly limited due to language barrier.  She is Senegalese-speaking and history was obtained with the help of a Senegalese  present in the room. Ms. Karolina Scott is a 64-year-old female with asthma, GERD, seasonal allergies who presented to the ER today with complaint of shortness of breath.  She states she has intermittent asthma.  Today, she was out in cold and her asthma flared up.  She was having shortness of breath.  Also, some chest discomfort so she presented to ER for further evaluation.  Here, she was seen by Dr. Sloan.  She was given some DuoNebs and her symptoms are much improved; however, she reported some right-sided chest discomfort which she states has been going on for several weeks, nonspecific, nonexertional and is worsened by palpation.  Initial labs was noted with slightly elevated troponin, so she was given aspirin and hospitalist requested admission for further evaluation.      REVIEW OF SYSTEMS:  A 10-point review of system was done and was negative apart from those mentioned in the history of present illness.      PAST MEDICAL HISTORY:   1.  Asthma.   2.  GERD.   3.  Seasonal allergies.   4.  History of central serous retinopathy.      MEDICATIONS  PRIOR TO ADMISSION:  Medications Prior to Admission   Medication Sig Dispense Refill Last Dose     albuterol (PROAIR HFA/PROVENTIL HFA/VENTOLIN HFA) 108 (90 Base) MCG/ACT inhaler Inhale 2 puffs into the lungs every 6 hours as needed for shortness of breath / dyspnea or wheezing        beclomethasone (QVAR) 80 MCG/ACT AERS IS A DISCONTINUED MEDICATION Apply 1 puff into each nare 2 times daily Uses it intranasally.   2/25/2019 at am     cetirizine (ZYRTEC) 10 MG tablet Take 10 mg by mouth daily   2/25/2019 at Unknown time     Cholecalciferol (VITAMIN D) 2000 UNITS tablet  Take 2,000 Units by mouth daily   Taking     fluticasone-salmeterol (ADVAIR) 250-50 MCG/DOSE inhaler Inhale 1 puff into the lungs 2 times daily   2/25/2019 at am     omeprazole (PRILOSEC) 20 MG CR capsule Take 20 mg by mouth daily with food   2/25/2019 at Unknown time        ALLERGIES:  IBUPROFEN, CORTICOSTEROIDS IS LISTED AS AFFECTING HER VISION DUE TO CENTRAL SEROUS RETINOPATHY TRIGGERED BY STEROIDS.  HOWEVER, SHE HAS BEEN PRESCRIBED PREDNISONE IN THE PAST AND SHE HERSELF DENIED ANY SIGNIFICANT REACTIONS TO STEROIDS.      SOCIAL HISTORY:  She denies any smoking, alcohol or illicit drug use.      FAMILY HISTORY:  Reviewed and not pertinent to current presentation.      PHYSICAL EXAMINATION:   GENERAL:  The patient is conscious, alert, oriented x3, lying comfortably in bed in no apparent distress.   VITAL SIGNS:  Temperature 97.7, heart rate of 110, blood pressure 139/89, saturation 99% on room air.   HEENT:  Pupils are equal and reactive to light and accommodation.  Extraocular movements are intact.  Oral mucosa is moist.   NECK:  Supple.  No JVD.   LUNGS:  Sounds are noted with mild bilateral expiratory wheezing, no crepitations.   CARDIOVASCULAR:  Normal S1, S2, slightly tachycardic.  No murmur.   ABDOMEN:  Soft, nontender, nondistended.  No guarding, rigidity or rebound tenderness.   EXTREMITIES:  Lower extremity with no edema.   NEUROLOGIC:  No focal neurological deficits noted.  Cranial nerves II-XII grossly intact.   PSYCHIATRIC:  Normal mood and affect.      LABORATORY DATA:  Reviewed in Epic.  BMP notable for a troponin of 0.071.  CBC was a WBC of 8.5, hemoglobin 11.3, platelet 205.  D-dimer was within normal limits at 0.5.  Influenza negative.  Chest x-ray reviewed by me shows no acute infiltrate, effusion or pneumothorax.  EKG reviewed, shows sinus tachycardia.      Ms. Karolina Scott is a 64-year-old female with asthma, GERD, seasonal allergies who presented to the ER today with complaints of shortness of  breath.     1.  Shortness of breath due to acute asthma exacerbation.   2.  Atypical chest discomfort.    - We will admit her for observation.  Her asthma is stable at this time and her symptoms are much improved after DuoNebs given in the ER.  Will continue with Xopenex nebs p.r.n. Since she is not actively wheezing and due to concerns of steroids affecting her vision due to central serous retinopathy, will hold off on steroids.  Her chest discomfort is atypical and is reproducible on palpation, likely musculoskeletal; however, due to elevated troponin, we will monitor her on telemetry.  We will follow serial troponins.  We will obtain an echocardiogram.  If troponins remain stable and echo unremarkable, she could follow up with her PCP for outpatient stress test.       3.  GERD. Resume Prilosec when verified.     4.  Seasonal allergies.  Resume Claritin when verified.   5.  DVT prophylaxis:  Encourage ambulation.  Anticipate a short stay.   6.  CODE STATUS:  Full code.         RAFA WILSON MD             D: 2019   T: 2019   MT: OLIVIA      Name:     TURNER GERARDO   MRN:      [id number]        Account:      [id number]   :      1955        Admitted:     2019                   Document: [id number]

## 2019-02-26 NOTE — ED NOTES
Report received. Patients vital signs stable- slightly tachycardic. Observation brochure given and patient verbalized understanding of her admission. Will continue to monitor.

## 2019-02-26 NOTE — PROGRESS NOTES
Coronary angio reveals no obstructive disease,. Minimal irregularities per report. Suggest medical management and lipid control. Echo reveals normal EF.   No other recommendations. We will see prn. Recall if questions.

## 2019-02-26 NOTE — PLAN OF CARE
Observation goals PRIOR TO DISCHARGE     Comments:  - Stable troponins-Not met    - ECHO -Not met    Nurse to notify provider when observation goals have been met and patient is ready for discharge.

## 2019-02-26 NOTE — CONSULTS
[facility]    Cardiology Consultation     Date of Admission:  2/25/2019    Assessment & Plan   Karolina Scott is a 64 year old female who was admitted on 2/25/2019.    1.  Elevated troponin/possible non-ST elevation myocardial infarction  She has been in with asthma exacerbation and shortness of breath.  She has mildly elevated troponin with a trend upwards to suggest acute coronary syndrome or non-ST elevation myocardial infarction.  Her chest pain is more appears to be chest wall tenderness and could be costochondritis.  However given the trend of elevated troponin (0.071 increased to 0.425), I would suggest further evaluation to rule out significant CAD.  We talked about options of coronary angiography versus stress imaging.  Stress imaging would be difficult given her wheezing and inability to use Lexiscan.  Her echo windows are suboptimal to use dobutamine stress echo.  Therefore, recommended coronary angiography as a definitive test to assess for any significant CAD that is obstructive.  After discussing the pros and cons, she is willing to proceed with it.   Risks and benefits of left heart catheterization and coronary angiogram were discussed with the patient in detail. 0.1-0.3% (for diagnostic angio) and 1-2% (for PCI)  risk of stroke, MI, death, emergent bypass for diagnostic angio, risk of contrast induced allergic reaction, renal dysfunction, vascular complications were discussed. Pros and cons of bare metal Vs drug eluting stents was discussed. Patient understands and wishes to proceed with it.    If there is no significant CAD, this troponin leak may be type II myocardial leak or ischemia.    2.  Right-sided chest pain, atypical, chest wall tenderness present.  Could be costochondritis.  Use pain medications as needed.    3.  Asthma exacerbation  Management per hospitalist.    Patient is allergic to ibuprofen which can cross react with aspirin and worsen asthma.  Therefore I would hold  on aspirin and start Plavix 75 g daily.  If there is no significant obstructive disease explaining the elevated troponin, Plavix can be discontinued.      Kristofer Bhatt MD    Primary Care Physician   Narinder Baez    Reason for Consult   Reason for consult: I was asked by hospitalist to evaluate this patient for elevated troponin.    History of Present Illness   This history might be slightly limited due to language barrier.    History obtained via the assistance of an .    She is Cuban-speaking, 64-year-old female with history of long-standing asthma, gastroesophageal reflux disease was admitted with shortness of breath that appeared to be an asthma flareup or exacerbation.  She has been getting nebulizations here as well as bronchodilators.  Here she complained of some right-sided chest wall tenderness and pain which she has been having off and on for last 1 month.  It was not related to exertion or associate with any pleuritic component of chest pain.  She believes that the pain is worse when she presses on her right chest wall.  Because of the chest pain, troponin was drawn which was elevated and increased 2.45 and therefore we were consulted.    EKG was done which revealed sinus rhythm without ischemic changes and I reviewed it.    Patient was having a bedside echo when I saw her and the EF appeared normal the wall motion was difficult to assess without contrast.  We are in the process of giving her IV contrast.    She denies any hypertension or diabetes.  She is non-smoker.  There is no family history of coronary artery disease.      Patient Active Problem List   Diagnosis     Seasonal allergies     Asthma, mild persistent     GERD (gastroesophageal reflux disease)     Obesity     Asthma exacerbation       Past Medical History   I have reviewed this patient's medical history and updated it with pertinent information if needed.   Past Medical History:   Diagnosis Date     Asthma, mild  persistent      GERD (gastroesophageal reflux disease)      Obesity      Seasonal allergies        Past Surgical History   I have reviewed this patient's surgical history and updated it with pertinent information if needed.  Past Surgical History:   Procedure Laterality Date     CATARACT IOL, RT/LT        SECTION         Prior to Admission Medications   Prior to Admission Medications   Prescriptions Last Dose Informant Patient Reported? Taking?   Cholecalciferol (VITAMIN D) 2000 UNITS tablet  Self Yes Yes   Sig: Take 2,000 Units by mouth daily   albuterol (PROAIR HFA/PROVENTIL HFA/VENTOLIN HFA) 108 (90 Base) MCG/ACT inhaler  Self Yes Yes   Sig: Inhale 2 puffs into the lungs every 6 hours as needed for shortness of breath / dyspnea or wheezing   beclomethasone (QVAR) 80 MCG/ACT AERS IS A DISCONTINUED MEDICATION 2019 at am Self Yes Yes   Sig: Apply 1 puff into each nare 2 times daily Uses it intranasally.   cetirizine (ZYRTEC) 10 MG tablet 2019 at Unknown time Self Yes Yes   Sig: Take 10 mg by mouth daily   fluticasone-salmeterol (ADVAIR) 250-50 MCG/DOSE inhaler 2019 at am Self Yes Yes   Sig: Inhale 1 puff into the lungs 2 times daily   omeprazole (PRILOSEC) 20 MG CR capsule 2019 at Unknown time Self Yes Yes   Sig: Take 20 mg by mouth daily with food      Facility-Administered Medications: None     Current Facility-Administered Medications   Medication Dose Route Frequency     aspirin  325 mg Oral Daily     fluticasone-salmeterol  1 puff Inhalation BID     ipratropium - albuterol 0.5 mg/2.5 mg/3 mL  3 mL Nebulization Q4H     omeprazole  20 mg Oral QAM AC     Current Facility-Administered Medications   Medication Last Rate     Allergies   Allergies   Allergen Reactions     Corticosteroids Swelling     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     Ibuprofen        Social History     reports that  has never smoked. She does not have any smokeless tobacco history on file. She reports that she does not drink alcohol or use drugs.    Family History   Family history was reviewed and no pertinent history noted.    Review of Systems   The comprehensive 10 point Review of Systems is negative other than noted in the HPI or here.     Physical Exam   Vital Signs with Ranges  Temp:  [97.3  F (36.3  C)-98.4  F (36.9  C)] 97.3  F (36.3  C)  Pulse:  [] 91  Resp:  [16-20] 18  BP: (129-158)/(59-89) 129/59  SpO2:  [98 %-99 %] 98 %  Wt Readings from Last 4 Encounters:   02/26/19 90.6 kg (199 lb 11.2 oz)   07/16/18 86 kg (189 lb 8 oz)   08/25/17 86.5 kg (190 lb 9.6 oz)   03/20/17 88 kg (194 lb)     No intake/output data recorded.      Vitals: /59 (BP Location: Left arm)   Pulse 91   Temp 97.3  F (36.3  C) (Oral)   Resp 18   Wt 90.6 kg (199 lb 11.2 oz)   SpO2 98%     Constitutional:   mildly obese   Eyes:   extra-ocular muscles intact   ENT:   oral pharynx with moist mucus membranes   Neck:   no jugular venous distension   Hematologic / Lymphatic:   no cervical lymphadenopathy   Back:   no curvature   Lungs:   clear to auscultation   Cardiovascular:   normal S1 and S2, no murmur noted and no edema   Abdomen:   non-distended and non-tender   Chest / Breast:   Rt chest wall tenderness noted     Neurologic:   Motor Exam:  moves all extremities well and symmetrically   Neuropsychiatric:   Orientation: oriented to self, place, time and situation   Skin:   no lesions       Recent Labs   Lab 02/26/19 0622 02/26/19 0220 02/25/19 2112   TROPI 0.425* 0.547* 0.071*       Recent Labs   Lab 02/26/19 0622 02/26/19 0220 02/25/19 2112   WBC  --   --  8.5   HGB  --   --  11.3*   MCV  --   --  86   PLT  --   --  205   NA  --   --  139   POTASSIUM  --   --  3.4   CHLORIDE  --   --  105   CO2  --   --  26   BUN  --   --  8   CR  --   --  0.64   GFRESTIMATED  --   --  >90   GFRESTBLACK  --   --  >90   ANIONGAP   --   --  8   JOSE  --   --  8.5   GLC  --   --  136*   TROPI 0.425* 0.547* 0.071*     Recent Labs   Lab Test 02/26/19  0622   CHOL 173   HDL 64   LDL 96   TRIG 63     Recent Labs   Lab 02/25/19 2112   WBC 8.5   HGB 11.3*   HCT 34.5*   MCV 86        No results for input(s): PH, PHV, PO2, PO2V, SAT, PCO2, PCO2V, HCO3, HCO3V in the last 168 hours.  No results for input(s): NTBNPI, NTBNP in the last 168 hours.  Recent Labs   Lab 02/25/19 2110   DD 0.5     No results for input(s): SED, CRP in the last 168 hours.  Recent Labs   Lab 02/25/19 2112        No results for input(s): TSH in the last 168 hours.  No results for input(s): COLOR, APPEARANCE, URINEGLC, URINEBILI, URINEKETONE, SG, UBLD, URINEPH, PROTEIN, UROBILINOGEN, NITRITE, LEUKEST, RBCU, WBCU in the last 168 hours.    Imaging:  Recent Results (from the past 48 hour(s))   XR Chest 2 Views    Narrative    XR CHEST TWO VIEWS   2/25/2019 9:38 PM     HISTORY: Dyspnea.    COMPARISON: Chest x-ray 7/16/2018.      Impression    IMPRESSION: PA and lateral views of the chest. Lungs are clear. Heart  is normal in size. No effusions are evident. No pneumothorax.    RENA LAYNE MD       Echo:  No results found for this or any previous visit (from the past 4320 hour(s)).

## 2019-02-26 NOTE — PROGRESS NOTES
1530 pt arrived care suites 1514 post cardiac angiogram, right groin site soft, no bleed or hematoma. 2x2 and tegederm to site. CDI. Denies pain.  here with her. Iv down to 75cc/hr. Pt given apple juice. Instructed thru  of bedrest restrictions.   1602 VSS. Denies pain. Right groin site CDI, soft, unchanged. Pt drinking juice.   1613 report called to [facility] obs unit. Will transfere pt via cart back to obs 6.

## 2019-02-26 NOTE — PROGRESS NOTES
Observation goals PRIOR TO DISCHARGE     Comments: Stable troponins; ECHO--NOT MET--troponins trending down, Echo done. Plan for angiogram this afternoon.  Nurse to notify provider when observation goals have been met and patient is ready for discharge.

## 2019-02-26 NOTE — PROGRESS NOTES
X cover 0259    Trops trending up 0.071---0.547 but patient with no chest pain    - continue telemetry  - ECHO in am  - given the significant rise in trend, will consult cardiology as well  - hold off on Heparin drip as long as she is chest pain free

## 2019-02-26 NOTE — PLAN OF CARE
Pt is an admission of the shift around midnight. Presented to ED with c/o of SOB, cough and CP. Trop was elevated(0.017). She is A&OX4, speaks Tristanian language,  ordered for this morning. Denies any chest pain nor SOB since admission to observation.Repeat trop around 0200 was elevated more(0.547), asymptomatic, denies chest pain, VSS on RA.On call hospitalist notified, said to continue monitoring pt in obs, get ECHO tomorrow and he placed order for cardio consult.I.V is s/l.

## 2019-02-26 NOTE — PROGRESS NOTES
Observation goals PRIOR TO DISCHARGE     Comments: Stable troponins; ECHO--NOT MET--troponins trending down, echo results pending  Nurse to notify provider when observation goals have been met and patient is ready for discharge.

## 2019-02-26 NOTE — ED PROVIDER NOTES
History     Chief Complaint:  Shortness of Breath, Cough    The history is provided by the patient. A  was used (Montenegrin).      Karolina Scott is a 64 year old female with a history of asthma and GERD who presents to the emergency department via EMS for evaluation of shortness of breath and a cough. For the past 5 days the patient endorses a cough with chest pain, worse with breathing and movement. Today she went outside and felt increasingly short of breath reminiscent of history of asthma attacks prompting her to contact EMS to be brought into the ED as she did not have her inhaler with her. En route she was given 1 Duoneb with improvement of her symptoms. Here, she complains of the prior episode of shortness of breath in addition to the persistent cough with chest pain for the past 5 days. She denies any fevers, leg pain or swelling, or sore throat, but complains of rhinorrhea and chills today. The patient reportedly has been using her inhalers at home and has a nebulizer machine, but has not been on any steroids recently for her asthma.     Allergies:  Corticosteroids  Ibuprofen    Medications:    Albuterol  Symbicort   Cetirizine  Fluticasone  Loratadine  Mometasone  Montelukast   Omeprazole    Past Medical History:    Asthma  GERD  Obesity    Past Surgical History:     Section  Cataract IOL    Family History:    No past pertinent family history.    Social History:  Marital Status:  Single [1]  Tobacco Use: No  Alcohol Use: No    Review of Systems   Constitutional: Positive for chills. Negative for fever.   HENT: Positive for rhinorrhea. Negative for sore throat.    Respiratory: Positive for cough and shortness of breath.    Cardiovascular: Negative for leg swelling.   All other systems reviewed and are negative.        Physical Exam     Patient Vitals for the past 24 hrs:   BP Temp Temp src Pulse Resp SpO2   19 2100 -- -- -- -- -- 99 %   192 144/89 97.7  F (36.5  C)  Oral 110 20 99 %       Physical Exam  Constitutional: vitals reviewed  HENT: Moist oral mucosa.   Eyes: Grossly normal vision. Pupils are equal and round. Extraocular movements intact.  Sclera clear and without icterus. Conjunctiva without pallor.  Cardiovascular: Normal rate. Regular rhythm. S1 and S2 without audible murmurs. 2+ radial pulses. Normal capillary refill.  Respiratory: Effort normal. Expiratory wheezing on right side  Gastrointestinal: Soft. No distension. No tenderness to palpation. No rebound or guarding.  Neurologic: Alert and awake. Coordinated movement of extremities. Speech normal.  Skin: No diaphoresis, rashes, ecchymoses, or lesions.  Musculoskeletal: No lower extremity edema. No gross deformity. No joint swelling.  Psychiatric: Appropriate affect. Behavior is normal. Intact recent and remote memory. Linear thought process.      Emergency Department Course   ECG:  Indication: Chest Pain  Time: 2151  Vent. Rate 117 bpm. MS interval 162. QRS duration 88. QT/QTc 328/457. P-R-T axis 69 69 45. Sinus tachycardia. Nonspecific ST abnormality. Abnormal ECG. Read time: 2159    Imaging:  XR Chest 2 views:   PA and lateral views of the chest. Lungs are clear. Heart is normal in size. No effusions are evident. No pneumothorax.   As per radiology.     Laboratory:  CBC: WBC: 8.5, HGB: 11.3 (L), PLT: 205  BMP: Glucose 136 (H), o/w WNL (Creatinine: 0.64)  Troponin I: 0.071 (H)  D Dimer: 0.5  Influenza A/B Antigen: Negative    Interventions:  2053 Albuterol-Ipratropium 2.5mg-0.5mg/3ml, inhalation solution, Nebulizer    Emergency Department Course:  2054 Nursing notes and vitals reviewed. I performed an exam of the patient as documented above.     IV inserted. Medicine administered as documented above. Blood drawn. This was sent to the lab for further testing, results above.    The patient was sent for a chest xray while in the emergency department, findings above.     EKG obtained in the ED, see results above.      5344 I rechecked the patient and discussed the results of her workup thus far.     7564 I consulted with Dr. Arevalo of the hospitalist services. They are in agreement to accept the patient for admission.    Findings and plan explained to the patient who consents to admission. Discussed the patient with Dr. Arevalo, who will admit the patient to an obs bed for further monitoring, evaluation, and treatment.    Impression & Plan      Medical Decision Making:  Patient who presents with some chest discomfort as well as acute onset dyspnea that feels similar to her asthma.  Exam shows wheezing in the right pulmonary fields.  She has normal SPO2 on room air.  She was given 2 DuoNeb's in total with improvement in her symptoms and resolution of the chest pain.  ECG is without signs of acute ischemia and shows type tachycardia likely related to her beta agonist use.  Troponin testing is mildly positive.  Given the absence of ongoing chest pain symptoms and atypical nature of her symptoms, I have low concern that this represents ACS but she does have some risk factors and requires admission for further troponin testing.  A full dose aspirin was given.  Chest x-ray is clear.  D-dimer undetectable.  Her care was signed out to the hospitalist team she left the emergency department in stable condition.      Diagnosis:    ICD-10-CM    1. Exacerbation of asthma, unspecified asthma severity, unspecified whether persistent J45.901 montelukast (SINGULAIR) 10 MG tablet     predniSONE (DELTASONE) 10 MG tablet   2. SOB (shortness of breath) R06.02 Troponin I     Troponin I     Lipid panel reflex to direct LDL     Lipid panel reflex to direct LDL     Potassium     CBC with platelets differential     Renal panel     CANCELED: Lipid panel reflex to direct LDL   3. Chest pain, unspecified type R07.9    4. Elevated troponin R74.8 aspirin (ASA) 81 MG EC tablet       Disposition:  Admitted to Dr. Irina Modi Disclosure:  Beto CROCKER  Jovan am serving as a scribe on 2/25/2019 at 8:54 PM to personally document services performed by Mina Sloan MD based on my observations and the provider's statements to me.     Beto Aldana  2/25/2019    EMERGENCY DEPARTMENT       Mina Sloan MD  02/27/19 8629

## 2019-02-26 NOTE — ED NOTES
Bed: ED03  Expected date: 2/25/19  Expected time: 8:40 PM  Means of arrival:   Comments:  HEMS 411  64F  Shortness of breath due to cold air

## 2019-02-26 NOTE — DISCHARGE INSTRUCTIONS
Cardiac Angiogram Discharge Instructions - Femoral    After you go home:      Have an adult stay with you until tomorrow.    Drink extra fluids for 2 days.    You may resume your normal diet.    No smoking       For 24 hours - due to the sedation you received:    Relax and take it easy.    Do NOT make any important or legal decisions.    Do NOT drive or operate machines at home or at work.    Do NOT drink alcohol.    Care of Groin Puncture Site:      For the first 24 hrs - check the puncture site every 1-2 hours while awake.    For 2 days, when you cough, sneeze, laugh or move your bowels, hold your hand over the puncture site and press firmly.    Remove the bandaid after 24 hours. If there is minor oozing, apply another bandaid and remove it after 12 hours.    It is normal to have a small bruise or pea size lump at the site.    You may shower tomorrow. Do NOT take a bath, or use a hot tub or pool for at least 3 days. Do NOT scrub the site. Do not use lotion or powder near the puncture site.    Activity:            For 2 days:    No stooping or squatting    Do NOT do any heavy activity such as exercise, lifting, or straining.     No housework, yard work or any activity that make you sweat    Do NOT lift more than 10 pounds    Bleeding:      If you start bleeding from the site in your groin, lie down flat and press firmly on/above the site for 10 minutes.     Once bleeding stops, lay flat for 2 hours.     Call [facility] as soon as you can.       Call 911 right away if you have heavy bleeding or bleeding that does not stop.      Medicines:      Take your medications, including blood thinners, unless your provider tells you not to.      If you have stopped any medicines, check with your provider about when to restart them.    Follow Up Appointments:      Follow up with [facility] Nurse Practitioner at [facility] of patient preference in 7-10 days.    Call the clinic if:      You have increased pain or a large or  growing hard lump around the site.    The site is red, swollen, hot or tender.    Blood or fluid is draining from the site.    You have chills or a fever greater than 101 F (38 C).    Your leg feels numb, cool or changes color.    You have hives, a rash or unusual itching.    New pain in the back or belly that you cannot control with Tylenol.    Any questions or concerns.          [facility] [place]:    [phone] [facility]MP (7 days a week)

## 2019-02-26 NOTE — PHARMACY-ADMISSION MEDICATION HISTORY
Admission medication history interview status for the 2/25/2019  admission is complete. See EPIC admission navigator for prior to admission medications     Medication history source reliability:Good    Actions taken by pharmacist (provider contacted, etc):None     Additional medication history information not noted on PTA med list :None    Medication reconciliation/reorder completed by provider prior to medication history? No    Time spent in this activity: 12 min    Prior to Admission medications    Medication Sig Last Dose Taking? Auth Provider   albuterol (PROAIR HFA/PROVENTIL HFA/VENTOLIN HFA) 108 (90 Base) MCG/ACT inhaler Inhale 2 puffs into the lungs every 6 hours as needed for shortness of breath / dyspnea or wheezing  Yes Unknown, Entered By History   beclomethasone (QVAR) 80 MCG/ACT AERS IS A DISCONTINUED MEDICATION Apply 1 puff into each nare 2 times daily Uses it intranasally. 2/25/2019 at am Yes Unknown, Entered By History   cetirizine (ZYRTEC) 10 MG tablet Take 10 mg by mouth daily 2/25/2019 at Unknown time Yes Reported, Patient   Cholecalciferol (VITAMIN D) 2000 UNITS tablet Take 2,000 Units by mouth daily  Yes Reported, Patient   fluticasone-salmeterol (ADVAIR) 250-50 MCG/DOSE inhaler Inhale 1 puff into the lungs 2 times daily 2/25/2019 at am Yes Unknown, Entered By History   omeprazole (PRILOSEC) 20 MG CR capsule Take 20 mg by mouth daily with food 2/25/2019 at Unknown time Yes Reported, Patient

## 2019-02-26 NOTE — PLAN OF CARE
A/Ox4. CMS intact. LS inspiratory & expiratory wheezing throughout--improved after neb was ordered & given--now diminished throughout. SOB. Upper airway wheezing at times. Non productive cough infrequent. Chest pain at times. Troponins trending down. Echo done. SBA. NPO. Plan for angiogram this afternoon. Potassium given & IV fluids running.

## 2019-02-26 NOTE — PROGRESS NOTES
MD Notification    Notified Person: MD    Notified Person Name: Dr. Arevalo     Notification Date/Time: 2/26/19 at 0255    Notification Interaction: Phone     Purpose of Notification: Critical troponin 0.547. Pt is asymptomatic.     Orders Received: See MD note

## 2019-02-26 NOTE — PLAN OF CARE
Observation goals PRIOR TO DISCHARGE     Comments:  - Stable troponins-Not met, increased from 0.071 to 0.547    - ECHO -Not met    Nurse to notify provider when observation goals have been met and patient is ready for discharge.

## 2019-02-26 NOTE — ED NOTES
"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     Ibuprofen        Activity level - Baseline/Home:  Independent    Activity Level - Current:   Independent     Needed?: Yes    Isolation: No  Infection: Not Applicable  Bariatric?: No    Vital Signs:   Vitals:    02/25/19 2052 02/25/19 2100 02/25/19 2300   BP: 144/89  139/89   Pulse: 110     Resp: 20     Temp: 97.7  F (36.5  C)     TempSrc: Oral     SpO2: 99% 99% 99%       Cardiac Rhythm: ,        Pain level: 0-10 Pain Scale: 4    Is this patient confused?: No   Does this patient have a guardian?  No         If yes, is there guardianship documents in the Epic \"Code/ACP\" activity?  No         Guardian Notified?  No  [place] - Suicide Severity Rating Scale Completed?  Yes  If yes, what color did the patient score?  White    Patient Report: Initial Complaint: shortness of breath  Focused Assessment: AOx3. AVSS, tachy at times. C/o chest pain. Dyspnea on exertion. NPO in ED.   Tests Performed:   Abnormal Labs Reviewed   CBC WITH PLATELETS DIFFERENTIAL - Abnormal; Notable for the following components:       Result Value    Hemoglobin 11.3 (*)     Hematocrit 34.5 (*)     All other components within normal limits   BASIC METABOLIC PANEL - Abnormal; Notable for the following components:    Glucose 136 (*)     All other components within normal limits   TROPONIN I - Abnormal; Notable for the following components:    Troponin I ES 0.071 (*)     All other components " within normal limits     Chest xray, ekg  Abnormal Results: elevated troponin  Treatments provided: duoneb    Family Comments: no one present    OBS brochure/video discussed/provided to patient/family: Yes              Name of person given brochure if not patient: self              Relationship to patient: self    ED Medications:   Medications   ipratropium - albuterol 0.5 mg/2.5 mg/3 mL (DUONEB) 0.5-2.5 (3) MG/3ML neb solution (3 mLs  Given 2/25/19 2053)       Drips infusing?:  No    For the majority of the shift this patient was Green.   Interventions performed were frequent rounding.    Severe Sepsis OR Septic Shock Diagnosis Present: No    To be done/followed up on inpatient unit:  continue to monitor    ED NURSE PHONE NUMBER: *[phone]

## 2019-02-27 VITALS
SYSTOLIC BLOOD PRESSURE: 139 MMHG | DIASTOLIC BLOOD PRESSURE: 68 MMHG | HEART RATE: 101 BPM | TEMPERATURE: 97.4 F | RESPIRATION RATE: 16 BRPM | OXYGEN SATURATION: 96 % | WEIGHT: 199.7 LBS

## 2019-02-27 LAB
ALBUMIN SERPL-MCNC: 2.8 G/DL (ref 3.4–5)
ANION GAP SERPL CALCULATED.3IONS-SCNC: 7 MMOL/L (ref 3–14)
BASOPHILS # BLD AUTO: 0 10E9/L (ref 0–0.2)
BASOPHILS NFR BLD AUTO: 0 %
BUN SERPL-MCNC: 9 MG/DL (ref 7–30)
CALCIUM SERPL-MCNC: 9.5 MG/DL (ref 8.5–10.1)
CHLORIDE SERPL-SCNC: 108 MMOL/L (ref 94–109)
CO2 SERPL-SCNC: 23 MMOL/L (ref 20–32)
CREAT SERPL-MCNC: 0.5 MG/DL (ref 0.52–1.04)
DIFFERENTIAL METHOD BLD: ABNORMAL
EOSINOPHIL # BLD AUTO: 0 10E9/L (ref 0–0.7)
EOSINOPHIL NFR BLD AUTO: 0 %
ERYTHROCYTE [DISTWIDTH] IN BLOOD BY AUTOMATED COUNT: 14.7 % (ref 10–15)
GFR SERPL CREATININE-BSD FRML MDRD: >90 ML/MIN/{1.73_M2}
GLUCOSE SERPL-MCNC: 242 MG/DL (ref 70–99)
HCT VFR BLD AUTO: 35.6 % (ref 35–47)
HGB BLD-MCNC: 11.8 G/DL (ref 11.7–15.7)
IMM GRANULOCYTES # BLD: 0 10E9/L (ref 0–0.4)
IMM GRANULOCYTES NFR BLD: 0.2 %
LYMPHOCYTES # BLD AUTO: 0.4 10E9/L (ref 0.8–5.3)
LYMPHOCYTES NFR BLD AUTO: 3.8 %
MCH RBC QN AUTO: 28.3 PG (ref 26.5–33)
MCHC RBC AUTO-ENTMCNC: 33.1 G/DL (ref 31.5–36.5)
MCV RBC AUTO: 85 FL (ref 78–100)
MONOCYTES # BLD AUTO: 0 10E9/L (ref 0–1.3)
MONOCYTES NFR BLD AUTO: 0.2 %
NEUTROPHILS # BLD AUTO: 9.8 10E9/L (ref 1.6–8.3)
NEUTROPHILS NFR BLD AUTO: 95.8 %
NRBC # BLD AUTO: 0 10*3/UL
NRBC BLD AUTO-RTO: 0 /100
PHOSPHATE SERPL-MCNC: 3.1 MG/DL (ref 2.5–4.5)
PLATELET # BLD AUTO: 220 10E9/L (ref 150–450)
POTASSIUM SERPL-SCNC: 4.2 MMOL/L (ref 3.4–5.3)
RBC # BLD AUTO: 4.17 10E12/L (ref 3.8–5.2)
SODIUM SERPL-SCNC: 138 MMOL/L (ref 133–144)
WBC # BLD AUTO: 10.2 10E9/L (ref 4–11)

## 2019-02-27 PROCEDURE — 94640 AIRWAY INHALATION TREATMENT: CPT

## 2019-02-27 PROCEDURE — 40000275 ZZH STATISTIC RCP TIME EA 10 MIN

## 2019-02-27 PROCEDURE — 25000132 ZZH RX MED GY IP 250 OP 250 PS 637: Performed by: INTERNAL MEDICINE

## 2019-02-27 PROCEDURE — 99207 ZZC CDG-CODE CATEGORY CHANGED: CPT | Performed by: INTERNAL MEDICINE

## 2019-02-27 PROCEDURE — 94640 AIRWAY INHALATION TREATMENT: CPT | Mod: 76

## 2019-02-27 PROCEDURE — 85025 COMPLETE CBC W/AUTO DIFF WBC: CPT | Performed by: INTERNAL MEDICINE

## 2019-02-27 PROCEDURE — 36415 COLL VENOUS BLD VENIPUNCTURE: CPT | Performed by: INTERNAL MEDICINE

## 2019-02-27 PROCEDURE — G0378 HOSPITAL OBSERVATION PER HR: HCPCS

## 2019-02-27 PROCEDURE — 25000125 ZZHC RX 250: Performed by: INTERNAL MEDICINE

## 2019-02-27 PROCEDURE — 25000128 H RX IP 250 OP 636: Performed by: INTERNAL MEDICINE

## 2019-02-27 PROCEDURE — 80069 RENAL FUNCTION PANEL: CPT | Performed by: INTERNAL MEDICINE

## 2019-02-27 PROCEDURE — 25000132 ZZH RX MED GY IP 250 OP 250 PS 637: Performed by: HOSPITALIST

## 2019-02-27 PROCEDURE — 96376 TX/PRO/DX INJ SAME DRUG ADON: CPT

## 2019-02-27 PROCEDURE — 99217 ZZC OBSERVATION CARE DISCHARGE: CPT | Performed by: INTERNAL MEDICINE

## 2019-02-27 RX ORDER — PREDNISONE 10 MG/1
TABLET ORAL
Qty: 20 TABLET | Refills: 0 | Status: SHIPPED | OUTPATIENT
Start: 2019-02-27 | End: 2019-06-05

## 2019-02-27 RX ORDER — MONTELUKAST SODIUM 10 MG/1
10 TABLET ORAL AT BEDTIME
Qty: 30 TABLET | Refills: 0 | Status: SHIPPED | OUTPATIENT
Start: 2019-02-27 | End: 2019-06-05

## 2019-02-27 RX ADMIN — PANTOPRAZOLE SODIUM 40 MG: 40 TABLET, DELAYED RELEASE ORAL at 06:52

## 2019-02-27 RX ADMIN — ACETAMINOPHEN 650 MG: 325 TABLET, FILM COATED ORAL at 08:53

## 2019-02-27 RX ADMIN — IPRATROPIUM BROMIDE AND ALBUTEROL SULFATE 3 ML: .5; 2.5 SOLUTION RESPIRATORY (INHALATION) at 03:11

## 2019-02-27 RX ADMIN — OMEPRAZOLE 20 MG: 20 CAPSULE, DELAYED RELEASE ORAL at 06:52

## 2019-02-27 RX ADMIN — IPRATROPIUM BROMIDE AND ALBUTEROL SULFATE 3 ML: .5; 2.5 SOLUTION RESPIRATORY (INHALATION) at 11:42

## 2019-02-27 RX ADMIN — METHYLPREDNISOLONE SODIUM SUCCINATE 40 MG: 40 INJECTION, POWDER, FOR SOLUTION INTRAMUSCULAR; INTRAVENOUS at 01:04

## 2019-02-27 RX ADMIN — METHYLPREDNISOLONE SODIUM SUCCINATE 40 MG: 40 INJECTION, POWDER, FOR SOLUTION INTRAMUSCULAR; INTRAVENOUS at 11:15

## 2019-02-27 RX ADMIN — IPRATROPIUM BROMIDE AND ALBUTEROL SULFATE 3 ML: .5; 2.5 SOLUTION RESPIRATORY (INHALATION) at 08:07

## 2019-02-27 RX ADMIN — METHYLPREDNISOLONE SODIUM SUCCINATE 40 MG: 40 INJECTION, POWDER, FOR SOLUTION INTRAMUSCULAR; INTRAVENOUS at 06:52

## 2019-02-27 RX ADMIN — ASPIRIN 81 MG: 81 TABLET, COATED ORAL at 08:00

## 2019-02-27 RX ADMIN — CLOPIDOGREL BISULFATE 75 MG: 75 TABLET, FILM COATED ORAL at 07:59

## 2019-02-27 NOTE — PROGRESS NOTES
OBSERVATION GOALS  Stable troponins: partially met, trending down  ECHO: met  Nurse to notify provider when observation goals have been met and patient is ready for discharge.

## 2019-02-27 NOTE — DISCHARGE SUMMARY
[facility]    Discharge Summary  Hospitalist    Date of Admission:  2/25/2019  Date of Discharge:  2/27/2019  Discharging Provider: Dimitrios Varma MD  Date of Service (when I saw the patient): 02/27/19    Discharge Diagnoses   Acute exacerbation of asthma  Non-ST elevation MI type II secondary to demand ischemia  Nonobstructive coronary coronary artery disease    History of Present Illness   Karolina Scott is an 64 year old female who presented with shortness of breath    Hospital Course      Ms. Karolina Scott is a 64-year-old female with asthma, GERD, seasonal allergies who presented to the ER today with complaints of shortness of breath and found to have elevated troponin and subsequently get admitted.     Discharge diagnosis and hospital course    1.  Acute exacerbation of asthma: Symptoms with shortness of breath and bilateral wheezing admitted first asthma exacerbation.  She was started on IV Solu-Medrol 40 mg every 6 hours aggressive nebulization with DuoNeb every 4 hours continue with her Advair.  She did well with that.  I will I will started on Singulair 10 mg daily at night continue with the Advair twice daily, albuterol inhaler as needed.  I will keep her on tapering dose of prednisone.  At this time she is not wheezing at all she has some mild cough but no shortness of breath.  Has asthma exacerbations improved she will be discharged home in stable condition.  On tapering dose of prednisone and new medication of Singulair at night.      2.  Acute non-ST elevation MI type II: Most likely secondary demand ischemia.  Patient has an elevated troponins, cardiology was consulted patient was taken to the Cath Lab.  Left heart catheterization done with coronary angiogram showed mild nonobstructive coronary artery disease.  No intervention needed.  Started her on aspirin 81 mg daily her cholesterol is at target range.  Discussed lifestyle changes.    3.  GERD.    She is on omeprazole 20 mg daily at  home.  We will continue with that.   4.  Seasonal allergies.    Stable..      At this time the patient is feeling well, no shortness of breath some mild cough mild discomfort at the right inguinal area on examination there is no bruising and no hematoma.  No tenderness.  At this time the patient will be discharged home on tapering dose of prednisone.  New medications singular 10 mg daily, prednisone for next few days.  As prescribed  Follow-up with the PCP in 1 week  Discharged home in stable condition.      Dimitrios Varma MD    Significant Results and Procedures   Procedures:  Coronary Angiogram:   -Both coronary arteries arise from their respective cusps.  -Dominance: Right  -LM is without angiographic evidence of disease.   -LAD: Type 3 [LAD supplies the entire apex]. The LAD gives rise to  septal perforators, D1 and D2. There are only minimal luminal  irregularities in the LAD and its branches  .  -LCX gives rise to OM1 and OM2. The LCx is tortuous and otherwise has  only minimal luminal irregularities in the LCx and its branches.   -RCA gives rise to a PDA. There are only minimal luminal  irregularities in the RCA and its branches.  SUMMARY:   Minimal non obstructive CAD    Pending Results   These results will be followed up by PCP  Unresulted Labs Ordered in the Past 30 Days of this Admission     No orders found from 12/27/2018 to 2/26/2019.          Code Status   Full Code       Primary Care Physician   Narinder Baez    Physical Exam   Temp: 97.4  F (36.3  C) Temp src: Oral BP: 139/68 Pulse: 101 Heart Rate: 101 Resp: 16 SpO2: 96 % O2 Device: None (Room air) Oxygen Delivery: 3 LPM  Vitals:    02/26/19 0000   Weight: 90.6 kg (199 lb 11.2 oz)     Vital Signs with Ranges  Temp:  [96.5  F (35.8  C)-97.4  F (36.3  C)] 97.4  F (36.3  C)  Pulse:  [] 101  Heart Rate:  [] 101  Resp:  [16-18] 16  BP: (113-155)/(55-70) 139/68  SpO2:  [93 %-98 %] 96 %  No intake/output data recorded.    Constitutional:  awake, alert, cooperative, no apparent distress, and appears stated age  Eyes: Lids and lashes normal, pupils equal, round and reactive to light, extra ocular muscles intact, sclera clear, conjunctiva normal  Respiratory: No increased work of breathing, good air exchange, clear to auscultation bilaterally, no crackles or wheezing  Cardiovascular: Normal apical impulse, regular rate and rhythm, normal S1 and S2, no S3 or S4, and no murmur noted  GI: No scars, normal bowel sounds, soft, non-distended, non-tender, no masses palpated, no hepatosplenomegally  Skin: no bruising or bleeding  Musculoskeletal: no lower extremity pitting edema present, right inguinal area no swelling hematoma or bruising.  Good peripheral pulses at the right leg.  Neurologic: No focal deficit    Discharge Disposition   Discharged to home  Condition at discharge: Stable    Consultations This Hospital Stay   CARDIOLOGY IP CONSULT  PHARMACY IP CONSULT  PHARMACY IP CONSULT  SMOKING CESSATION PROGRAM IP CONSULT    Time Spent on this Encounter   IDimitrios, personally saw the patient today and spent greater than 30 minutes discharging this patient.    Discharge Orders      Follow-up and recommended labs and tests     Follow up with primary care provider, Narinder Baez, within 7 days for hospital follow- up.  No follow up labs or test are needed.     Reason for your hospital stay    Asthma exacerbation     Activity    Your activity upon discharge: activity as tolerated     Full Code     Diet    Follow this diet upon discharge: Orders Placed This Encounter      Advance Diet as Tolerated: Regular Diet Adult     Discharge Medications   Current Discharge Medication List      START taking these medications    Details   aspirin (ASA) 81 MG EC tablet Take 1 tablet (81 mg) by mouth daily  Qty: 30 tablet    Associated Diagnoses: Elevated troponin      montelukast (SINGULAIR) 10 MG tablet Take 1 tablet (10 mg) by mouth At Bedtime  Qty: 30 tablet,  Refills: 0    Associated Diagnoses: Exacerbation of asthma, unspecified asthma severity, unspecified whether persistent      predniSONE (DELTASONE) 10 MG tablet 4 tabs daily for 2 days, then 3 tabs daily for 2 days, then 2 tabs daily for 2 days, then 1 tab daily for 2 days, then stop.  Qty: 20 tablet, Refills: 0    Associated Diagnoses: Exacerbation of asthma, unspecified asthma severity, unspecified whether persistent         CONTINUE these medications which have NOT CHANGED    Details   albuterol (PROAIR HFA/PROVENTIL HFA/VENTOLIN HFA) 108 (90 Base) MCG/ACT inhaler Inhale 2 puffs into the lungs every 6 hours as needed for shortness of breath / dyspnea or wheezing      cetirizine (ZYRTEC) 10 MG tablet Take 10 mg by mouth daily      Cholecalciferol (VITAMIN D) 2000 UNITS tablet Take 2,000 Units by mouth daily      fluticasone-salmeterol (ADVAIR) 250-50 MCG/DOSE inhaler Inhale 1 puff into the lungs 2 times daily      omeprazole (PRILOSEC) 20 MG CR capsule Take 20 mg by mouth daily with food         STOP taking these medications       beclomethasone (QVAR) 80 MCG/ACT AERS IS A DISCONTINUED MEDICATION Comments:   Reason for Stopping:             Allergies   Allergies   Allergen Reactions     Corticosteroids Swelling     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     Ibuprofen      Data   Most Recent 3 CBC's:  Recent Labs   Lab Test 02/27/19  0623 02/25/19  2112 03/20/17  1259   WBC 10.2 8.5 7.5   HGB 11.8 11.3* 13.3   MCV 85 86 92    205 203      Most Recent 3 BMP's:  Recent Labs   Lab Test 02/27/19  0623 02/26/19  1214 02/25/19  2112 03/20/17  1259     --  139 140   POTASSIUM 4.2 3.7 3.4 4.0   CHLORIDE 108  --  105 107   CO2 23  --  26 22   BUN 9  --  8 9   CR 0.50*  --  0.64 0.44*   ANIONGAP 7  --  8 11   JOSE 9.5  --  8.5 9.1   *  --  136* 82     Most Recent 2 LFT's:No lab results  found.  Most Recent INR's and Anticoagulation Dosing History:  Anticoagulation Dose History     There is no flowsheet data to display.        Most Recent 3 Troponin's:  Recent Labs   Lab Test 02/26/19  0622 02/26/19  0220 02/25/19 2112   TROPI 0.425* 0.547* 0.071*     Most Recent Cholesterol Panel:  Recent Labs   Lab Test 02/26/19  0622   CHOL 173   LDL 96   HDL 64   TRIG 63     Most Recent 6 Bacteria Isolates From Any Culture (See EPIC Reports for Culture Details):  Recent Labs   Lab Test 07/16/18  1344   CULT No beta hemolytic Streptococcus Group A isolated     Most Recent TSH, T4 and A1c Labs:No lab results found.  Results for orders placed or performed during the hospital encounter of 02/25/19   XR Chest 2 Views    Narrative    XR CHEST TWO VIEWS   2/25/2019 9:38 PM     HISTORY: Dyspnea.    COMPARISON: Chest x-ray 7/16/2018.      Impression    IMPRESSION: PA and lateral views of the chest. Lungs are clear. Heart  is normal in size. No effusions are evident. No pneumothorax.    RENA LAYNE MD     Most Recent 3 CBC's:  Recent Labs   Lab Test 02/27/19  0623 02/25/19 2112 03/20/17  1259   WBC 10.2 8.5 7.5   HGB 11.8 11.3* 13.3   MCV 85 86 92    205 203     Most Recent 3 BMP's:  Recent Labs   Lab Test 02/27/19  0623 02/26/19  1214 02/25/19 2112 03/20/17  1259     --  139 140   POTASSIUM 4.2 3.7 3.4 4.0   CHLORIDE 108  --  105 107   CO2 23  --  26 22   BUN 9  --  8 9   CR 0.50*  --  0.64 0.44*   ANIONGAP 7  --  8 11   JOSE 9.5  --  8.5 9.1   *  --  136* 82

## 2019-02-27 NOTE — PLAN OF CARE
A/Ox4. VSS on RA except tachycardic. Tele Sinus tach. Pt reports GRANDE and intermittent SOB, infrequent non-productive cough noted. LS diminished. R groin sit unchanged, no hematoma noted. CMS intact. IV SL. Trops trending down. Japanese speaking, understands some English.  at bedside. Pt to discharge home with transportation provided by family member. Discharge instructions reviewed with pt. Pt discharging with meds and belongings. Questions answered. Pt to follow-up with PCP in one week.

## 2019-02-27 NOTE — PLAN OF CARE
OBSERVATION GOALS  Stable troponins: partially met, trending down  ECHO: met  Nurse to notify provider when observation goals have been met and patient is ready for discharge.    A&Ox4. Vincent[place] speaking, communicates with simple terms.  ordered for today. VSS on RA ex tachy (100's). Tele Sinus Tachy. Up with SBA. Regular diet. Denies pain.  Rt groin site, unchanged no hematoma, WDL. CMS intact. LS diminished with ex wheezes. tachypneic breathing, infrequent cough, receiving scheduled Nebs. IV-SL. Trop levels 0.547 & 0.425. Continue to monitor.

## 2019-02-27 NOTE — PLAN OF CARE
OBSERVATION GOALS  Stable troponins: partially met, trending down  ECHO: met  Nurse to notify provider when observation goals have been met and patient is ready for discharge.      Patient returned from care suites post cath lab around 1630. VS stable on room air. Bedrest until 1715. Right groin site stable, intact with gauze and tegaderm, no hematoma. Patient tolerated PO intake. Discharge plan pending, likely home tomorrow.

## 2019-02-28 ENCOUNTER — TELEPHONE (OUTPATIENT)
Dept: CARDIOLOGY | Facility: CLINIC | Age: 64
End: 2019-02-28

## 2019-02-28 DIAGNOSIS — R07.9 CHEST PAIN: Primary | ICD-10-CM

## 2019-02-28 NOTE — TELEPHONE ENCOUNTER
Patient was evaluated by cardiology while inpatient for chest pain, SOB and mild troponin elevation. 2/26/19 coronary angiogram shows minimal non obstructive CAD. Called patient using [place] interpretor, to discuss any post hospital d/c questions she may have, review medication changes, and confirm f/u appts.  Patient denied any questions regarding new medications or changes with their PTA medications. Patient denied any SOB, chest pain, or light headedness. RFA cardiac cath site is without bleeding, swelling, redness or tenderness. Denies fever. RN confirmed with patient via interpretor that she needs to schedule a f/u cardiology FAHAD OV post angiogram. Order placed.  Patient advised to call clinic with any cardiac related questions or concerns prior to this fahad't. Patient verbalized understanding and agreed with plan. Scheduling phone number provided and pt will have daughter call back to schedule. ESCOBAR Mulligan RN.

## 2019-03-15 ENCOUNTER — OFFICE VISIT (OUTPATIENT)
Dept: URGENT CARE | Facility: URGENT CARE | Age: 64
End: 2019-03-15
Payer: COMMERCIAL

## 2019-03-15 VITALS
OXYGEN SATURATION: 97 % | DIASTOLIC BLOOD PRESSURE: 85 MMHG | HEART RATE: 96 BPM | SYSTOLIC BLOOD PRESSURE: 175 MMHG | TEMPERATURE: 99.3 F | RESPIRATION RATE: 16 BRPM

## 2019-03-15 DIAGNOSIS — J45.901 EXACERBATION OF ASTHMA, UNSPECIFIED ASTHMA SEVERITY, UNSPECIFIED WHETHER PERSISTENT: Primary | ICD-10-CM

## 2019-03-15 PROCEDURE — 99214 OFFICE O/P EST MOD 30 MIN: CPT | Performed by: FAMILY MEDICINE

## 2019-03-15 RX ORDER — ALBUTEROL SULFATE 90 UG/1
2 AEROSOL, METERED RESPIRATORY (INHALATION) EVERY 6 HOURS
Qty: 6.7 G | Refills: 0 | Status: SHIPPED | OUTPATIENT
Start: 2019-03-15 | End: 2019-08-15

## 2019-03-15 RX ORDER — PREDNISONE 50 MG/1
50 TABLET ORAL DAILY
Qty: 5 TABLET | Refills: 0 | Status: SHIPPED | OUTPATIENT
Start: 2019-03-15 | End: 2019-03-20

## 2019-03-15 NOTE — PROGRESS NOTES
SUBJECTIVE:   Chief Complaint   Patient presents with     Urgent Care     cough and wheezing for 5 days     Patient  is a 64 year old female who presented to urgent care clinic for evaluation of cough and wheezing    Acute Illness   Concerns:Cough   When did it start? Five days ago  Is it getting better, worse or staying the same? unchanged    Fatigue/Achiness?:No     Fever?: No     Chills/Sweats?: No     Headache (location?)No     Sinus Pressure?:No     Eye redness/Discharge?: No     Ear Pain?: No     Runny nose?: No     Congestion?:  YES     Sore Throat?: No   Respiratory    Cough?:  YES-non-productive    Wheeze?:  YES   GI/    Decreased Appetite?: No     Nausea?:No     Vomiting?: No     Diarrhea?:  No     Dysuria/Frequency?.:No       Any Illness Exposure?: No     Any foreign travel or contact with anyone ill who travelled abroad? No     Therapies Tried and outcome: Albuterol inhaler      Review of Systems review of system negative except as mentioned in HPI.       Past Medical History:   Diagnosis Date     Asthma, mild persistent      GERD (gastroesophageal reflux disease)      Obesity      Seasonal allergies      No family history on file.  Current Outpatient Medications   Medication Sig Dispense Refill     albuterol (PROAIR HFA/PROVENTIL HFA/VENTOLIN HFA) 108 (90 Base) MCG/ACT inhaler Inhale 2 puffs into the lungs every 6 hours as needed for shortness of breath / dyspnea or wheezing       aspirin (ASA) 81 MG EC tablet Take 1 tablet (81 mg) by mouth daily 30 tablet      cetirizine (ZYRTEC) 10 MG tablet Take 10 mg by mouth daily       Cholecalciferol (VITAMIN D) 2000 UNITS tablet Take 2,000 Units by mouth daily       fluticasone-salmeterol (ADVAIR) 250-50 MCG/DOSE inhaler Inhale 1 puff into the lungs 2 times daily       montelukast (SINGULAIR) 10 MG tablet Take 1 tablet (10 mg) by mouth At Bedtime 30 tablet 0     omeprazole (PRILOSEC) 20 MG CR capsule Take 20 mg by mouth daily with food       predniSONE  (DELTASONE) 10 MG tablet 4 tabs daily for 2 days, then 3 tabs daily for 2 days, then 2 tabs daily for 2 days, then 1 tab daily for 2 days, then stop. (Patient not taking: Reported on 3/15/2019.) 20 tablet 0     Social History     Tobacco Use     Smoking status: Never Smoker     Smokeless tobacco: Never Used   Substance Use Topics     Alcohol use: No       OBJECTIVE  /85   Pulse 96   Temp 99.3  F (37.4  C) (Tympanic)   Resp 16   SpO2 97%     Physical Exam   Constitutional: She appears well-developed and well-nourished. No distress.   HENT:   Head: Normocephalic and atraumatic.   Cardiovascular: Normal rate, regular rhythm and normal heart sounds.   Pulmonary/Chest: No stridor. She is in respiratory distress. She has wheezes (Diffused).   Skin: She is not diaphoretic.     Labs:  No results found for this or any previous visit (from the past 24 hour(s)).    X-Ray was not done.    ASSESSMENT:    Karolina was seen today for urgent care.    Diagnoses and all orders for this visit:    Exacerbation of asthma, unspecified asthma severity, unspecified whether persistent  -     albuterol (PROAIR HFA/PROVENTIL HFA/VENTOLIN HFA) 108 (90 Base) MCG/ACT inhaler; Inhale 2 puffs into the lungs every 6 hours  -     predniSONE (DELTASONE) 50 MG tablet; Take 50 mg by mouth daily for 5 days.      Mirian Krishna MD

## 2019-06-05 ENCOUNTER — APPOINTMENT (OUTPATIENT)
Dept: GENERAL RADIOLOGY | Facility: CLINIC | Age: 64
End: 2019-06-05
Attending: FAMILY MEDICINE
Payer: COMMERCIAL

## 2019-06-05 ENCOUNTER — HOSPITAL ENCOUNTER (EMERGENCY)
Facility: CLINIC | Age: 64
Discharge: HOME | End: 2019-06-06
Attending: FAMILY MEDICINE | Admitting: FAMILY MEDICINE
Payer: COMMERCIAL

## 2019-06-05 DIAGNOSIS — J45.41 MODERATE PERSISTENT ASTHMA WITH EXACERBATION: ICD-10-CM

## 2019-06-05 PROCEDURE — 99285 EMERGENCY DEPT VISIT HI MDM: CPT | Mod: Z6 | Performed by: FAMILY MEDICINE

## 2019-06-05 PROCEDURE — 25000131 ZZH RX MED GY IP 250 OP 636 PS 637: Performed by: FAMILY MEDICINE

## 2019-06-05 PROCEDURE — 25000125 ZZHC RX 250: Performed by: FAMILY MEDICINE

## 2019-06-05 PROCEDURE — 99285 EMERGENCY DEPT VISIT HI MDM: CPT | Mod: 25

## 2019-06-05 PROCEDURE — 94640 AIRWAY INHALATION TREATMENT: CPT

## 2019-06-05 PROCEDURE — 71046 X-RAY EXAM CHEST 2 VIEWS: CPT

## 2019-06-05 RX ORDER — IPRATROPIUM BROMIDE AND ALBUTEROL SULFATE 2.5; .5 MG/3ML; MG/3ML
3 SOLUTION RESPIRATORY (INHALATION) ONCE
Status: COMPLETED | OUTPATIENT
Start: 2019-06-05 | End: 2019-06-06

## 2019-06-05 RX ORDER — PREDNISONE 20 MG/1
40 TABLET ORAL ONCE
Status: COMPLETED | OUTPATIENT
Start: 2019-06-05 | End: 2019-06-05

## 2019-06-05 RX ORDER — IPRATROPIUM BROMIDE AND ALBUTEROL SULFATE 2.5; .5 MG/3ML; MG/3ML
3 SOLUTION RESPIRATORY (INHALATION) ONCE
Status: COMPLETED | OUTPATIENT
Start: 2019-06-05 | End: 2019-06-05

## 2019-06-05 RX ORDER — ALBUTEROL SULFATE 0.83 MG/ML
2.5 SOLUTION RESPIRATORY (INHALATION) ONCE
Status: COMPLETED | OUTPATIENT
Start: 2019-06-05 | End: 2019-06-05

## 2019-06-05 RX ORDER — BUDESONIDE AND FORMOTEROL FUMARATE DIHYDRATE 160; 4.5 UG/1; UG/1
2 AEROSOL RESPIRATORY (INHALATION)
COMMUNITY
Start: 2017-01-17 | End: 2019-08-15

## 2019-06-05 RX ADMIN — IPRATROPIUM BROMIDE AND ALBUTEROL SULFATE 3 ML: .5; 3 SOLUTION RESPIRATORY (INHALATION) at 20:47

## 2019-06-05 RX ADMIN — ALBUTEROL SULFATE 2.5 MG: 2.5 SOLUTION RESPIRATORY (INHALATION) at 22:55

## 2019-06-05 RX ADMIN — PREDNISONE 40 MG: 20 TABLET ORAL at 22:55

## 2019-06-05 ASSESSMENT — ENCOUNTER SYMPTOMS
CHEST TIGHTNESS: 1
EYE REDNESS: 0
COUGH: 1
FEVER: 0
HEADACHES: 0
COLOR CHANGE: 0
ABDOMINAL PAIN: 0
CHILLS: 1
DIFFICULTY URINATING: 0
CONFUSION: 0
ARTHRALGIAS: 0
SHORTNESS OF BREATH: 1
NECK STIFFNESS: 0

## 2019-06-05 NOTE — ED AVS SNAPSHOT
[facility], [place], Emergency Department  [address]  [facility] [address]  Phone:  [phone]  Fax:  [phone]                                    Karolina Scott   MRN: [id number]    Department:  [facility], Emergency Department   Date of Visit:  6/5/2019           After Visit Summary Signature Page    I have received my discharge instructions, and my questions have been answered. I have discussed any challenges I see with this plan with the nurse or doctor.    ..........................................................................................................................................  Patient/Patient Representative Signature      ..........................................................................................................................................  Patient Representative Print Name and Relationship to Patient    ..................................................               ................................................  Date                                   Time    ..........................................................................................................................................  Reviewed by Signature/Title    ...................................................              ..............................................  Date                                               Time          22EPIC Rev 08/18

## 2019-06-06 VITALS
RESPIRATION RATE: 18 BRPM | OXYGEN SATURATION: 96 % | SYSTOLIC BLOOD PRESSURE: 124 MMHG | TEMPERATURE: 98.9 F | DIASTOLIC BLOOD PRESSURE: 57 MMHG | WEIGHT: 198.7 LBS | HEART RATE: 104 BPM

## 2019-06-06 PROCEDURE — 25000125 ZZHC RX 250: Performed by: FAMILY MEDICINE

## 2019-06-06 RX ORDER — BUDESONIDE AND FORMOTEROL FUMARATE DIHYDRATE 160; 4.5 UG/1; UG/1
2 AEROSOL RESPIRATORY (INHALATION) 2 TIMES DAILY
Qty: 1 INHALER | Refills: 1 | Status: SHIPPED | OUTPATIENT
Start: 2019-06-06 | End: 2019-08-15

## 2019-06-06 RX ORDER — PREDNISONE 20 MG/1
TABLET ORAL
Qty: 10 TABLET | Refills: 0 | Status: SHIPPED | OUTPATIENT
Start: 2019-06-06 | End: 2019-08-15

## 2019-06-06 RX ORDER — ALBUTEROL SULFATE 90 UG/1
2 AEROSOL, METERED RESPIRATORY (INHALATION) EVERY 6 HOURS PRN
Qty: 8.5 G | Refills: 1 | Status: SHIPPED | OUTPATIENT
Start: 2019-06-06 | End: 2019-08-15

## 2019-06-06 RX ORDER — IPRATROPIUM BROMIDE AND ALBUTEROL SULFATE 2.5; .5 MG/3ML; MG/3ML
1 SOLUTION RESPIRATORY (INHALATION) EVERY 4 HOURS PRN
Qty: 1 BOX | Refills: 1 | Status: SHIPPED | OUTPATIENT
Start: 2019-06-06

## 2019-06-06 RX ADMIN — IPRATROPIUM BROMIDE AND ALBUTEROL SULFATE 3 ML: .5; 3 SOLUTION RESPIRATORY (INHALATION) at 00:01

## 2019-06-06 ASSESSMENT — ENCOUNTER SYMPTOMS
WOUND: 0
BRUISES/BLEEDS EASILY: 0
ACTIVITY CHANGE: 1
NAUSEA: 0
VOICE CHANGE: 0
APPETITE CHANGE: 0
WEAKNESS: 0
DECREASED CONCENTRATION: 1
HALLUCINATIONS: 0
VOMITING: 0
DYSPHORIC MOOD: 1
FLANK PAIN: 0
TROUBLE SWALLOWING: 0
MYALGIAS: 1
SORE THROAT: 0
JOINT SWELLING: 0

## 2019-06-06 NOTE — ED PROVIDER NOTES
[facility] EMERGENCY DEPARTMENT ([facility])    19       History     Chief Complaint   Patient presents with     Asthma Exacerbation     Pt c/o shortness of breath.      The history is provided by the patient and medical records.     Karolina Scott is a 64 year old female who has a PMHx of asthma, GERD, seasonal allergies, CAD, with recent admission (-2019) for acute asthma exacerbation and NSTEMI, who presents to the Emergency Department for evaluation of shortness of breath.  The patient is here with her  who provides part of history.  Patient reports 5 days of worsening cough, shortness of breath and now with chest tightness.  She endorses chills and bilateral leg pain associated with this.  She states that she is using inhalers at home but that this has not been sufficient to manage her symptoms. Patient wants her symbicort refilled as the advair is not helping. Patient has been on prednisone in the past also. No fever or hemoptysis. Sx consistent with asthma.      I have reviewed the Medications, Allergies, Past Medical and Surgical History, and Social History in the [employer] system.    Past Medical History:   Diagnosis Date     Asthma, mild persistent      GERD (gastroesophageal reflux disease)      Obesity      Seasonal allergies        Past Surgical History:   Procedure Laterality Date     CATARACT IOL, RT/LT        SECTION       CV HEART CATHETERIZATION WITH POSSIBLE INTERVENTION N/A 2019    Procedure: Heart Catheterization with possible Intervention;  Surgeon: Jb Eldridge MD;  Location:  HEART CARDIAC CATH LAB       History reviewed. No pertinent family history.    Social History     Tobacco Use     Smoking status: Never Smoker     Smokeless tobacco: Never Used   Substance Use Topics     Alcohol use: No       No current facility-administered medications for this encounter.      Current Outpatient Medications   Medication     albuterol (PROAIR HFA/PROVENTIL  HFA/VENTOLIN HFA) 108 (90 Base) MCG/ACT inhaler     albuterol (PROAIR HFA/PROVENTIL HFA/VENTOLIN HFA) 108 (90 Base) MCG/ACT inhaler     budesonide-formoterol (SYMBICORT) 160-4.5 MCG/ACT Inhaler     fluticasone-salmeterol (ADVAIR) 250-50 MCG/DOSE inhaler     ipratropium - albuterol 0.5 mg/2.5 mg/3 mL (DUONEB) 0.5-2.5 (3) MG/3ML neb solution     predniSONE (DELTASONE) 20 MG tablet     albuterol (PROAIR HFA/PROVENTIL HFA/VENTOLIN HFA) 108 (90 Base) MCG/ACT inhaler     budesonide-formoterol (SYMBICORT) 160-4.5 MCG/ACT Inhaler     cetirizine (ZYRTEC) 10 MG tablet        Allergies   Allergen Reactions     Corticosteroids Swelling     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     Ibuprofen         Review of Systems   Constitutional: Positive for activity change and chills. Negative for appetite change and fever.   HENT: Negative for congestion, sore throat, trouble swallowing and voice change.    Eyes: Negative for redness and visual disturbance.   Respiratory: Positive for cough, chest tightness and shortness of breath.    Cardiovascular: Positive for leg swelling (bilateral pain). Negative for chest pain.   Gastrointestinal: Negative for abdominal pain, nausea and vomiting.   Genitourinary: Negative for difficulty urinating and flank pain.   Musculoskeletal: Positive for myalgias. Negative for arthralgias, joint swelling and neck stiffness.        Positive for leg pain   Skin: Negative for color change, rash and wound.   Neurological: Negative for syncope, weakness and headaches.   Hematological: Does not bruise/bleed easily.   Psychiatric/Behavioral: Positive for decreased concentration and dysphoric mood. Negative for confusion and hallucinations.   All other systems reviewed and are negative.      Physical Exam   BP: 164/77  Pulse: 104  Heart Rate: 106  Temp: 96.6  F (35.9  C)  Resp: 18  Weight: 90.1 kg (198  lb 11.2 oz)  SpO2: 98 %      Physical Exam   Constitutional: She is oriented to person, place, and time. She appears well-developed and well-nourished. She appears distressed.   Patient moderate distress here.  Family member does interpret for her.  Patient with some labored breathing pattern with cough noted with some expiratory wheezing bilateral.   HENT:   Head: Normocephalic and atraumatic.   Eyes: Pupils are equal, round, and reactive to light. Conjunctivae and EOM are normal. No scleral icterus.   Neck: Normal range of motion. Neck supple.   Cardiovascular: Normal rate and regular rhythm.   Pulmonary/Chest: She is in respiratory distress. She has wheezes.   Abdominal: She exhibits no distension and no mass. There is no tenderness. There is no guarding.   Musculoskeletal: She exhibits edema and tenderness.   Bilateral edema 1+ mild tenderness   Neurological: She is alert and oriented to person, place, and time.   Skin: Skin is warm and dry. Capillary refill takes less than 2 seconds. No rash noted. She is not diaphoretic. No erythema. No pallor.   Psychiatric:   flat   Nursing note and vitals reviewed.      ED Course   8:32 PM  The patient was seen and examined by Abhijit Izaguirre MD in Room ED18.       Procedures         Patient evaluated ER.  Initially given DuoNeb with some improvement.  Chest x-ray did not show any infiltrate or effusions pneumothorax etc.  Discussed with patient also with family member.  Patient then given albuterol neb given prednisone 40 mill grams orally.  Patient given 1 more DuoNeb feeling better lungs breath sounds improved bilateral patient feeling better vital signs stable is comfortable going home.  Discussed with daughter was on the phone.  Patient will get a refill on her Symbicort along with this Ventolin inhaler refill DuoNeb solution along with a nebulizer machine that was sent with her prednisone for 5 days follow-up with MD return if any concerns agrees with plan comfortably  discharged.       Critical Care time:  none             Labs Ordered and Resulted from Time of ED Arrival Up to the Time of Departure from the ED - No data to display  Results for orders placed or performed during the hospital encounter of 06/05/19   XR Chest 2 Views    Narrative    CHEST TWO VIEW   6/5/2019 9:45 PM     HISTORY: Asthma exacerbation with pain.    COMPARISON: 2/25/2019      Impression    IMPRESSION: No acute abnormality. Lungs are well-inflated and clear.  Heart size is normal.    WILLAM HART MD            Assessments & Plan (with Medical Decision Making)  64-year-old female with history of asthma presents now with 5 days of exacerbation.  No fever noted.  Patient was evaluated here in the ER vital signs stable otherwise chest x-ray did not show any infiltrates she was treated with 2 duo nebs and albuterol neb with improvement prednisone 40 mill grams orally also.  Patient has been on Symbicort in the past but is on Advair now but she does not feel this is helping as well.  We did refill her Symbicort gave her a refill on her Ventolin inhaler prednisone for 5 days along with this DuoNeb solution and a nebulizer machine was sent with the patient.  She will follow-up with MD for recheck return if worsening symptoms patient feels much better comfortably discharged with family member.  At this point more most likely consistent with asthma exacerbation.           I have reviewed the nursing notes.    I have reviewed the findings, diagnosis, plan and need for follow up with the patient.       Medication List      Started    ipratropium - albuterol 0.5 mg/2.5 mg/3 mL 0.5-2.5 (3) MG/3ML neb solution  Commonly known as:  DUONEB  1 vial, Nebulization, EVERY 4 HOURS PRN     predniSONE 20 MG tablet  Commonly known as:  DELTASONE  Take two tablets (= 40mg) each day for 5 (five) days        Modified    * albuterol 108 (90 Base) MCG/ACT inhaler  Commonly known as:  PROAIR HFA/PROVENTIL HFA/VENTOLIN HFA  What  changed:  Another medication with the same name was added. Make sure you understand how and when to take each.     * albuterol 108 (90 Base) MCG/ACT inhaler  Commonly known as:  PROAIR HFA/PROVENTIL HFA/VENTOLIN HFA  2 puffs, Inhalation, EVERY 6 HOURS  What changed:  Another medication with the same name was added. Make sure you understand how and when to take each.     * albuterol 108 (90 Base) MCG/ACT inhaler  Commonly known as:  PROAIR HFA/PROVENTIL HFA/VENTOLIN HFA  2 puffs, Inhalation, EVERY 6 HOURS PRN  What changed:  You were already taking a medication with the same name, and this prescription was added. Make sure you understand how and when to take each.     * SYMBICORT 160-4.5 MCG/ACT Inhaler  Generic drug:  budesonide-formoterol  What changed:  Another medication with the same name was added. Make sure you understand how and when to take each.     * budesonide-formoterol 160-4.5 MCG/ACT Inhaler  Commonly known as:  SYMBICORT  2 puffs, Inhalation, 2 TIMES DAILY  What changed:  You were already taking a medication with the same name, and this prescription was added. Make sure you understand how and when to take each.         * This list has 5 medication(s) that are the same as other medications prescribed for you. Read the directions carefully, and ask your doctor or other care provider to review them with you.                Final diagnoses:   Moderate persistent asthma with exacerbation     Sidney CROCKER, am serving as a trained medical scribe to document services personally performed by Abhijit Izaguirre MD, based on the provider's statements to me.   Zina CROCKER MD, was physically present and have reviewed and verified the accuracy of this note documented by Sidney Wise.     6/5/2019   [facility], [place], EMERGENCY DEPARTMENT     Abhijit Izaguirre MD  06/06/19 0208

## 2019-06-06 NOTE — DISCHARGE INSTRUCTIONS
Home.  Your CXR did not show any pneumonia.  Use the nebulizer as directed for wheezing.  Use the symbicort and ventolin inhaler also as directed.  Take the prednisone for 5 days to help breathing.  See MD in next few days for a recheck.  REturn if any concerns.

## 2019-08-15 ENCOUNTER — HOSPITAL ENCOUNTER (EMERGENCY)
Facility: CLINIC | Age: 64
Discharge: HOME | End: 2019-08-15
Attending: EMERGENCY MEDICINE | Admitting: EMERGENCY MEDICINE
Payer: COMMERCIAL

## 2019-08-15 VITALS
OXYGEN SATURATION: 99 % | SYSTOLIC BLOOD PRESSURE: 139 MMHG | DIASTOLIC BLOOD PRESSURE: 73 MMHG | HEART RATE: 110 BPM | TEMPERATURE: 97.8 F | RESPIRATION RATE: 18 BRPM

## 2019-08-15 DIAGNOSIS — J45.901 MILD ASTHMA WITH EXACERBATION, UNSPECIFIED WHETHER PERSISTENT: ICD-10-CM

## 2019-08-15 PROCEDURE — 99283 EMERGENCY DEPT VISIT LOW MDM: CPT | Mod: 25

## 2019-08-15 PROCEDURE — 25000132 ZZH RX MED GY IP 250 OP 250 PS 637: Performed by: EMERGENCY MEDICINE

## 2019-08-15 PROCEDURE — 94640 AIRWAY INHALATION TREATMENT: CPT

## 2019-08-15 PROCEDURE — 25000131 ZZH RX MED GY IP 250 OP 636 PS 637: Performed by: EMERGENCY MEDICINE

## 2019-08-15 PROCEDURE — 25000125 ZZHC RX 250: Performed by: EMERGENCY MEDICINE

## 2019-08-15 RX ORDER — ALBUTEROL SULFATE 5 MG/ML
2.5 SOLUTION RESPIRATORY (INHALATION) ONCE
Status: COMPLETED | OUTPATIENT
Start: 2019-08-15 | End: 2019-08-15

## 2019-08-15 RX ORDER — BUDESONIDE AND FORMOTEROL FUMARATE DIHYDRATE 160; 4.5 UG/1; UG/1
2 AEROSOL RESPIRATORY (INHALATION) 2 TIMES DAILY
Qty: 1 INHALER | Refills: 0 | Status: SHIPPED | OUTPATIENT
Start: 2019-08-15

## 2019-08-15 RX ORDER — ALBUTEROL SULFATE 0.83 MG/ML
2.5 SOLUTION RESPIRATORY (INHALATION) EVERY 6 HOURS PRN
Qty: 1 BOX | Refills: 0 | Status: SHIPPED | OUTPATIENT
Start: 2019-08-15

## 2019-08-15 RX ORDER — ACETAMINOPHEN 325 MG/1
650 TABLET ORAL ONCE
Status: COMPLETED | OUTPATIENT
Start: 2019-08-15 | End: 2019-08-15

## 2019-08-15 RX ORDER — PREDNISONE 20 MG/1
TABLET ORAL
Qty: 8 TABLET | Refills: 0 | Status: SHIPPED | OUTPATIENT
Start: 2019-08-15 | End: 2021-07-26

## 2019-08-15 RX ORDER — PREDNISONE 20 MG/1
40 TABLET ORAL ONCE
Status: COMPLETED | OUTPATIENT
Start: 2019-08-15 | End: 2019-08-15

## 2019-08-15 RX ORDER — ALBUTEROL SULFATE 90 UG/1
2 AEROSOL, METERED RESPIRATORY (INHALATION) EVERY 4 HOURS PRN
Qty: 1 INHALER | Refills: 0 | Status: SHIPPED | OUTPATIENT
Start: 2019-08-15

## 2019-08-15 RX ADMIN — ACETAMINOPHEN 650 MG: 325 TABLET, FILM COATED ORAL at 23:18

## 2019-08-15 RX ADMIN — ALBUTEROL SULFATE 2.5 MG: 2.5 SOLUTION RESPIRATORY (INHALATION) at 23:20

## 2019-08-15 RX ADMIN — PREDNISONE 40 MG: 20 TABLET ORAL at 23:18

## 2019-08-15 ASSESSMENT — ENCOUNTER SYMPTOMS
WEAKNESS: 1
RHINORRHEA: 0
FEVER: 0
SHORTNESS OF BREATH: 1

## 2019-08-15 NOTE — ED AVS SNAPSHOT
Emergency Department  [address] [place] [id number]  Phone:  [phone]  Fax:  [phone]                                    Karolina Scott   MRN: [id number]    Department:   Emergency Department   Date of Visit:  8/15/2019           After Visit Summary Signature Page    I have received my discharge instructions, and my questions have been answered. I have discussed any challenges I see with this plan with the nurse or doctor.    ..........................................................................................................................................  Patient/Patient Representative Signature      ..........................................................................................................................................  Patient Representative Print Name and Relationship to Patient    ..................................................               ................................................  Date                                   Time    ..........................................................................................................................................  Reviewed by Signature/Title    ...................................................              ..............................................  Date                                               Time          22EPIC Rev 08/18

## 2019-08-16 NOTE — ED NOTES
Bed: ED15  Expected date: 8/15/19  Expected time: 10:20 PM  Means of arrival: Ambulance  Comments:  Jose Tinsley 64F asthma; nebs x2

## 2019-08-16 NOTE — ED PROVIDER NOTES
History     Chief Complaint:  Shortness of Breath    The history is provided by the patient.      Karolina Scott is a 64 year old female, with a history of asthma, who presents with her family for shortness of breath. Earlier this evening, patient was moving stuff around when she became short of breath. Patient tried using her inhaler but her shortness of breath did not resolve, so she called EMS who transported the patient to the ED. En route, patient was given two duo nebs and she stated that she felt better afterwards. Here, patient states she also has generalized weakness. No rhinorrhea, fever, or other pain. No recent trauma. Patient has a nebulizer at home but states that she has ran out of medication.     Of note, the patient is Kyrgyz speaking.  We offered to get an  and also had the Jaber at bedside, but the patient declined as a family member as interpreted.    PE/DVT Risk Factors:  The patient denies a personal or family history of PE, DVT, or other clotting disorders. The patient denies any recent travel, surgery, or other prolonged immobilization. The patient denies tobacco use or hormone use.    Allergies:  Corticosteroids  Ibuprofen    Medications:    Albuterol INHALER   Symbicort 160-4.5 MCG/ACT inhaler   Zyrtec   Advair  Duoneb    Past Medical History:    Asthma  Asthma exacerbation  Obesity  HERD  Seasonal allergies     Past Surgical History:    Cataract IOL     CV heart catheterization    Family History:    No past pertinent family history.    Social History:  Negative for tobacco use.  Negative for alcohol use.  Negative for drug use.  Presents with her family.   Marital Status:  Single.     Review of Systems   Constitutional: Negative for fever.   HENT: Negative for rhinorrhea.    Respiratory: Positive for shortness of breath.    Neurological: Positive for weakness.   All other systems reviewed and are negative.    Physical Exam     Patient Vitals for the past 24 hrs:   BP  Temp Temp src Pulse Heart Rate Resp SpO2   08/15/19 2308 -- -- -- -- 101 -- 100 %   08/15/19 2244 139/73 97.8  F (36.6  C) Oral 110 110 18 98 %     Physical Exam   General:  Sitting on bed with family at bedside.   HENT:  No obvious trauma to head  Right Ear:  External ear normal.   Left Ear:  External ear normal.   Nose:  Nose normal.   Eyes:  Conjunctivae and EOM are normal. Pupils are equal, round, and reactive.   Neck: Normal range of motion. Neck supple. No tracheal deviation present.   CV:  Normal heart sounds. No murmur heard.  Pulm/Chest: Effort normal and breath sounds normal. Moderate air flow throughout  Abd: Soft. No distension. There is no tenderness. There is no rigidity, no rebound and no guarding.   M/S: Normal range of motion.   Neuro: Alert.   Skin: Skin is warm and dry. No rash noted. Not diaphoretic.   Psych: Normal mood and affect. Behavior is normal.     Emergency Department Course   Interventions:  2318 Prednisone 40 mg PO  2318 Tylenol 650 mg PO  Albuterol neb 2.5 mg    Emergency Department Course:  2252 Nursing notes and vitals reviewed. I performed an exam of the patient as documented above.     Medicine administered as documented above.    Findings and plan explained to the Patient. Patient discharged home with instructions regarding supportive care, medications, and reasons to return. The importance of close follow-up was reviewed. The patient was prescribed albuterol, Symbicort, and prednisone.     I personally reviewed and answered all related questions prior to discharge.    Impression & Plan    Medical Decision Making:  Karolina Scott is a very pleasant 64 year old female who presents for evaluation of shortness of breath and wheezing.  Signs and symptoms are consistent with asthma exacerbation.  A broad differential was considered including foreign body, asthma, reactive airway disease, pneumothorax, cardiac equivalent, viral induced wheezing, allergic phenomena, etc.  Patient feels  improved after interventions by EMS and here in ED.  There are no signs at this point of any serious etiologies including those mentioned above.  No indication for hospitalization at this time including no hypoxia, no marked increase in respiratory rate, minimal to no retractions.   Supportive outpatient management is indicated, medications for discharge noted above.  The patient has a nebulizer at home.  She is ran out of all of her medicines including Symbicort, albuterol inhaler, albuterol for the nebulizer and is requesting prednisone.  She is allergic to corticosteroids per chart, but the patient reports that it only causes swelling in her legs and she is fine with being on them for a short period of time.  Close followup with primary care physician.  Return if increased wheezing, progressive shortness of breath, develops fever greater than 102.      The treatment plan was discussed with the patient and they expressed understanding of this plan and consented to the plan.  In addition, the patient will return to the emergency department if their symptoms persist, worsen, if new symptoms arise or if there is any concern as other pathology may be present that is not evident at this time. They also understand the importance of close follow up in the clinic and if unable to do so will return to the emergency department for a reevaluation. All questions were answered.    Diagnosis:    ICD-10-CM    1. Mild asthma with exacerbation, unspecified whether persistent J45.901      Disposition:  discharged to home with her family.     Discharge Medications:  New Prescriptions    ALBUTEROL (PROAIR HFA) 108 (90 BASE) MCG/ACT INHALER    Inhale 2 puffs into the lungs every 4 hours as needed for shortness of breath / dyspnea    ALBUTEROL (PROVENTIL) (2.5 MG/3ML) 0.083% NEB SOLUTION    Take 1 vial (2.5 mg) by nebulization every 6 hours as needed for shortness of breath / dyspnea or wheezing    BUDESONIDE-FORMOTEROL (SYMBICORT)  160-4.5 MCG/ACT INHALER    Inhale 2 puffs into the lungs 2 times daily    PREDNISONE (DELTASONE) 20 MG TABLET    Take two tablets (= 40mg) each day for 4 (four) days     Scribe Disposition  I, Marissa Garrison, am serving as a scribe on 8/15/2019 at 11:00 PM to personally document services performed by Eloy Harris DO based on my observations and the provider's statements to me.     Marissa Garrison  8/15/2019    EMERGENCY DEPARTMENT       Eloy Harris DO  08/15/19 [id number]

## 2019-09-02 ENCOUNTER — TRANSFERRED RECORDS (OUTPATIENT)
Dept: HEALTH INFORMATION MANAGEMENT | Facility: CLINIC | Age: 64
End: 2019-09-02

## 2021-07-26 ENCOUNTER — OFFICE VISIT (OUTPATIENT)
Dept: URGENT CARE | Facility: URGENT CARE | Age: 66
End: 2021-07-26
Payer: COMMERCIAL

## 2021-07-26 VITALS
TEMPERATURE: 98.6 F | HEART RATE: 85 BPM | RESPIRATION RATE: 20 BRPM | SYSTOLIC BLOOD PRESSURE: 124 MMHG | DIASTOLIC BLOOD PRESSURE: 70 MMHG | WEIGHT: 197 LBS | OXYGEN SATURATION: 100 %

## 2021-07-26 DIAGNOSIS — N30.00 ACUTE CYSTITIS WITHOUT HEMATURIA: ICD-10-CM

## 2021-07-26 DIAGNOSIS — R30.0 DYSURIA: Primary | ICD-10-CM

## 2021-07-26 LAB
ALBUMIN UR-MCNC: NEGATIVE MG/DL
APPEARANCE UR: CLEAR
BACTERIA #/AREA URNS HPF: ABNORMAL /HPF
BILIRUB UR QL STRIP: NEGATIVE
COLOR UR AUTO: YELLOW
GLUCOSE UR STRIP-MCNC: NEGATIVE MG/DL
HGB UR QL STRIP: NEGATIVE
KETONES UR STRIP-MCNC: NEGATIVE MG/DL
LEUKOCYTE ESTERASE UR QL STRIP: ABNORMAL
NITRATE UR QL: NEGATIVE
PH UR STRIP: 5.5 [PH] (ref 5–7)
RBC #/AREA URNS AUTO: ABNORMAL /HPF
SP GR UR STRIP: 1.01 (ref 1–1.03)
UROBILINOGEN UR STRIP-ACNC: 0.2 E.U./DL
WBC #/AREA URNS AUTO: ABNORMAL /HPF

## 2021-07-26 PROCEDURE — 87086 URINE CULTURE/COLONY COUNT: CPT | Performed by: PHYSICIAN ASSISTANT

## 2021-07-26 PROCEDURE — 81001 URINALYSIS AUTO W/SCOPE: CPT | Performed by: PHYSICIAN ASSISTANT

## 2021-07-26 PROCEDURE — 99213 OFFICE O/P EST LOW 20 MIN: CPT | Performed by: PHYSICIAN ASSISTANT

## 2021-07-26 RX ORDER — CIPROFLOXACIN 250 MG/1
250 TABLET, FILM COATED ORAL 2 TIMES DAILY
Qty: 14 TABLET | Refills: 0 | Status: SHIPPED | OUTPATIENT
Start: 2021-07-26 | End: 2021-08-02

## 2021-07-27 NOTE — PROGRESS NOTES
Assessment & Plan     Dysuria  UA positive, mild  Urine culture pending  Fluids, rest  cipro for infection  - UA Macro with Reflex to Micro and Culture - lab collect; Future  - UA Macro with Reflex to Micro and Culture - lab collect  - Urine Microscopic  - ciprofloxacin (CIPRO) 250 MG tablet; Take 1 tablet (250 mg) by mouth 2 times daily for 7 days  - Urine Culture    Acute cystitis without hematuria  Urine culture pending  - ciprofloxacin (CIPRO) 250 MG tablet; Take 1 tablet (250 mg) by mouth 2 times daily for 7 days  - Urine Culture       No follow-ups on file.    Sage Murphy PA-C  [facility] DARRION Turcios is a 66 year old who presents for the following health issues     HPI     Dysuria  Urinary frequency    Review of Systems   Constitutional, HEENT, cardiovascular, pulmonary, gi and gu systems are negative, except as otherwise noted.      Objective    /70   Pulse 85   Temp 98.6  F (37  C)   Resp 20   Wt 89.4 kg (197 lb)   SpO2 100%   There is no height or weight on file to calculate BMI.  Physical Exam   GENERAL: healthy, alert and no distress  MS: no gross musculoskeletal defects noted, no edema  SKIN: no suspicious lesions or rashes  NEURO: Normal strength and tone, mentation intact and speech normal  PSYCH: mentation appears normal, affect normal/bright    Results for orders placed or performed in visit on 07/26/21   UA Macro with Reflex to Micro and Culture - lab collect     Status: Abnormal    Specimen: Urine, Midstream   Result Value Ref Range    Color Urine Yellow Colorless, Straw, Light Yellow, Yellow    Appearance Urine Clear Clear    Glucose Urine Negative Negative mg/dL    Bilirubin Urine Negative Negative    Ketones Urine Negative Negative mg/dL    Specific Gravity Urine 1.010 1.003 - 1.035    Blood Urine Negative Negative    pH Urine 5.5 5.0 - 7.0    Protein Albumin Urine Negative Negative mg/dL    Urobilinogen Urine 0.2 0.2, 1.0 E.U./dL    Nitrite  Urine Negative Negative    Leukocyte Esterase Urine Small (A) Negative   Urine Microscopic     Status: Abnormal   Result Value Ref Range    Bacteria Urine Few (A) None Seen /HPF    RBC Urine 0-2 0-2 /HPF /HPF    WBC Urine 0-5 0-5 /HPF /HPF    Narrative    Urine Culture not indicated

## 2021-07-28 LAB — BACTERIA UR CULT: NORMAL

## 2023-09-15 ENCOUNTER — TRANSFERRED RECORDS (OUTPATIENT)
Dept: HEALTH INFORMATION MANAGEMENT | Facility: CLINIC | Age: 68
End: 2023-09-15

## 2023-10-20 ENCOUNTER — OFFICE VISIT (OUTPATIENT)
Dept: CARDIOLOGY | Facility: CLINIC | Age: 68
End: 2023-10-20
Payer: MEDICARE

## 2023-10-20 VITALS
DIASTOLIC BLOOD PRESSURE: 64 MMHG | SYSTOLIC BLOOD PRESSURE: 97 MMHG | WEIGHT: 182.1 LBS | OXYGEN SATURATION: 100 % | HEIGHT: 58 IN | BODY MASS INDEX: 38.22 KG/M2 | HEART RATE: 81 BPM

## 2023-10-20 DIAGNOSIS — R00.0 RAPID HEART RATE: Primary | ICD-10-CM

## 2023-10-20 PROCEDURE — 93005 ELECTROCARDIOGRAM TRACING: CPT | Performed by: INTERNAL MEDICINE

## 2023-10-20 PROCEDURE — 99204 OFFICE O/P NEW MOD 45 MIN: CPT | Performed by: INTERNAL MEDICINE

## 2023-10-20 RX ORDER — ATORVASTATIN CALCIUM 20 MG/1
20 TABLET, FILM COATED ORAL DAILY
COMMUNITY

## 2023-10-20 RX ORDER — LATANOPROST 50 UG/ML
1 SOLUTION/ DROPS OPHTHALMIC
COMMUNITY
Start: 2023-03-29

## 2023-10-20 RX ORDER — LISINOPRIL 10 MG/1
10 TABLET ORAL DAILY
COMMUNITY

## 2023-10-20 RX ORDER — MONTELUKAST SODIUM 10 MG/1
10 TABLET ORAL PRN
COMMUNITY

## 2023-10-20 RX ORDER — AMLODIPINE BESYLATE 5 MG/1
5 TABLET ORAL DAILY
COMMUNITY

## 2023-10-20 RX ORDER — CHOLECALCIFEROL (VITAMIN D3) 50 MCG
2000 TABLET ORAL DAILY
COMMUNITY
Start: 2023-06-07

## 2023-10-20 RX ORDER — PSEUDOEPHED/ACETAMINOPH/DIPHEN 30MG-500MG
1000 TABLET ORAL EVERY 6 HOURS PRN
COMMUNITY
Start: 2023-03-24

## 2023-10-20 NOTE — PROGRESS NOTES
CARDIOLOGY CLINIC CONSULTATION    PRIMARY CARE PHYSICIAN:  Narinder Baez    HISTORY OF PRESENT ILLNESS:  The patient is a 68-year-old female with history of hypertension, chronic asthma, obesity and hyperlipidemia who is here for further evaluation regarding episodes of heart racing.    She was hospitalized here several years ago with asthma exacerbation.  She was noted to have mild elevated troponin which led to coronary angiography.  The angiogram revealed minimal coronary disease.    She was again hospitalized this summer with asthma exacerbation and respiratory failure.  She was hospitalized at an outside facility.  She had an echocardiogram during her hospitalization which revealed preserved LV function with an ejection fraction greater than 70%.    She reports episodes of heart racing since her hospitalization in December.  The episodes have become less frequent but are still persistent.  No chest pain, presyncope or syncope.  She continues to have chronic dyspnea related to her asthma.    PAST MEDICAL HISTORY:  Past Medical History:   Diagnosis Date    Asthma, mild persistent     GERD (gastroesophageal reflux disease)     Obesity     Seasonal allergies        MEDICATIONS:  Current Outpatient Medications   Medication    acetaminophen (TYLENOL) 500 MG tablet    albuterol (PROAIR HFA) 108 (90 Base) MCG/ACT inhaler    albuterol (PROVENTIL) (2.5 MG/3ML) 0.083% neb solution    budesonide-formoterol (SYMBICORT) 160-4.5 MCG/ACT Inhaler    cetirizine (ZYRTEC) 10 MG tablet    latanoprost (XALATAN) 0.005 % ophthalmic solution    omeprazole (PRILOSEC) 20 MG DR capsule    VITAMIN D3 50 MCG (2000 UT) tablet    amLODIPine (NORVASC) 5 MG tablet    atorvastatin (LIPITOR) 20 MG tablet    fluticasone-salmeterol (ADVAIR) 250-50 MCG/DOSE inhaler    ipratropium - albuterol 0.5 mg/2.5 mg/3 mL (DUONEB) 0.5-2.5 (3) MG/3ML neb solution    lisinopril (ZESTRIL) 10 MG tablet    montelukast (SINGULAIR) 10 MG tablet     No current  facility-administered medications for this visit.       SOCIAL HISTORY:  I have reviewed this patient's social history and updated it with pertinent information if needed. Karolina Scott  reports that she has never smoked. She has never used smokeless tobacco. She reports that she does not drink alcohol and does not use drugs.    PHYSICAL EXAM:  Pulse:  [81] 81  BP: (97)/(64) 97/64  SpO2:  [100 %] 100 %  182 lbs 1.6 oz    Constitutional: alert, no distress  Respiratory: Good bilateral air entry  Cardiovascular: Regular rate and rhythm  GI: nondistended  Neuropsychiatric: appropriate affact    ASSESSMENT: Pertinent issues addressed/ reviewed during this cardiology visit  Palpitations  History of mild nonobstructive coronary disease: She denies angina  Hypertension  Chronic asthma with recent exacerbation requiring hospitalization    RECOMMENDATIONS:  14-day event monitor to rule out arrhythmia.  Continue amlodipine and lisinopril for blood pressure  Continue atorvastatin for hyperlipidemia  Increase activity to help improve weight loss.    It was a pleasure seeing this patient in clinic today. Please do not hesitate to contact me with any future questions.     Roni Wise MD, [facility]  Cardiology - [facility] Heart  October 20, 2023    Review of the result(s) of each unique test - Last ECG, BMP, echocardiogram and coronary angiogram     The level of medical decision making during this visit was of moderate complexity.    This note was completed in part using dictation via the Dragon voice recognition software. Some word and grammatical errors may occur and must be interpreted in the appropriate clinical context.  If there are any questions pertaining to this issue, please contact me for further clarification.

## 2023-10-20 NOTE — LETTER
10/20/2023    Narinder Baez MD  [facility] [address]  [facility] [address]    RE: Karolina Scott       Dear Colleague,     I had the pleasure of seeing Karolina Scott in the [facility].  CARDIOLOGY CLINIC CONSULTATION    PRIMARY CARE PHYSICIAN:  Narinder Baez    HISTORY OF PRESENT ILLNESS:  The patient is a 68-year-old female with history of hypertension, chronic asthma, obesity and hyperlipidemia who is here for further evaluation regarding episodes of heart racing.    She was hospitalized here several years ago with asthma exacerbation.  She was noted to have mild elevated troponin which led to coronary angiography.  The angiogram revealed minimal coronary disease.    She was again hospitalized this summer with asthma exacerbation and respiratory failure.  She was hospitalized at an outside facility.  She had an echocardiogram during her hospitalization which revealed preserved LV function with an ejection fraction greater than 70%.    She reports episodes of heart racing since her hospitalization in December.  The episodes have become less frequent but are still persistent.  No chest pain, presyncope or syncope.  She continues to have chronic dyspnea related to her asthma.    PAST MEDICAL HISTORY:  Past Medical History:   Diagnosis Date    Asthma, mild persistent     GERD (gastroesophageal reflux disease)     Obesity     Seasonal allergies        MEDICATIONS:  Current Outpatient Medications   Medication    acetaminophen (TYLENOL) 500 MG tablet    albuterol (PROAIR HFA) 108 (90 Base) MCG/ACT inhaler    albuterol (PROVENTIL) (2.5 MG/3ML) 0.083% neb solution    budesonide-formoterol (SYMBICORT) 160-4.5 MCG/ACT Inhaler    cetirizine (ZYRTEC) 10 MG tablet    latanoprost (XALATAN) 0.005 % ophthalmic solution    omeprazole (PRILOSEC) 20 MG DR capsule    VITAMIN D3 50 MCG (2000 UT) tablet    amLODIPine (NORVASC) 5 MG tablet    atorvastatin (LIPITOR) 20 MG  tablet    fluticasone-salmeterol (ADVAIR) 250-50 MCG/DOSE inhaler    ipratropium - albuterol 0.5 mg/2.5 mg/3 mL (DUONEB) 0.5-2.5 (3) MG/3ML neb solution    lisinopril (ZESTRIL) 10 MG tablet    montelukast (SINGULAIR) 10 MG tablet     No current facility-administered medications for this visit.       SOCIAL HISTORY:  I have reviewed this patient's social history and updated it with pertinent information if needed. Karolina Scott  reports that she has never smoked. She has never used smokeless tobacco. She reports that she does not drink alcohol and does not use drugs.    PHYSICAL EXAM:  Pulse:  [81] 81  BP: (97)/(64) 97/64  SpO2:  [100 %] 100 %  182 lbs 1.6 oz    Constitutional: alert, no distress  Respiratory: Good bilateral air entry  Cardiovascular: Regular rate and rhythm  GI: nondistended  Neuropsychiatric: appropriate affact    ASSESSMENT: Pertinent issues addressed/ reviewed during this cardiology visit  Palpitations  History of mild nonobstructive coronary disease: She denies angina  Hypertension  Chronic asthma with recent exacerbation requiring hospitalization    RECOMMENDATIONS:  14-day event monitor to rule out arrhythmia.  Continue amlodipine and lisinopril for blood pressure  Continue atorvastatin for hyperlipidemia  Increase activity to help improve weight loss.    It was a pleasure seeing this patient in clinic today. Please do not hesitate to contact me with any future questions.     Roni Wise MD, [facility]  Cardiology - [facility]  October 20, 2023    Review of the result(s) of each unique test - Last ECG, BMP, echocardiogram and coronary angiogram     The level of medical decision making during this visit was of moderate complexity.    This note was completed in part using dictation via the Dragon voice recognition software. Some word and grammatical errors may occur and must be interpreted in the appropriate clinical context.  If there are any questions pertaining to this issue, please contact me for  further clarification.    [facility]  cc:   No referring provider defined for this encounter.

## 2023-10-26 ENCOUNTER — HOSPITAL ENCOUNTER (OUTPATIENT)
Dept: CARDIOLOGY | Facility: CLINIC | Age: 68
Discharge: HOME | End: 2023-10-26
Attending: INTERNAL MEDICINE | Admitting: INTERNAL MEDICINE
Payer: MEDICARE

## 2023-10-26 DIAGNOSIS — R00.0 RAPID HEART RATE: ICD-10-CM

## 2023-10-26 PROCEDURE — 93246 EXT ECG>7D<15D RECORDING: CPT

## 2023-10-26 PROCEDURE — 93248 EXT ECG>7D<15D REV&INTERPJ: CPT | Performed by: INTERNAL MEDICINE

## 2023-11-20 ENCOUNTER — TELEPHONE (OUTPATIENT)
Dept: CARDIOLOGY | Facility: CLINIC | Age: 68
End: 2023-11-20
Payer: MEDICARE

## 2023-11-20 NOTE — TELEPHONE ENCOUNTER
Ziop[employer] monitor results available:           Pt was seen by Dr. Wise on 10/20/23, per note:     ASSESSMENT: Pertinent issues addressed/ reviewed during this cardiology visit  Palpitations  History of mild nonobstructive coronary disease: She denies angina  Hypertension  Chronic asthma with recent exacerbation requiring hospitalization     RECOMMENDATIONS:  14-day event monitor to rule out arrhythmia.  Continue amlodipine and lisinopril for blood pressure  Continue atorvastatin for hyperlipidemia  Increase activity to help improve weight loss.    Will review results with Dr. Wise.

## 2023-11-22 ENCOUNTER — APPOINTMENT (OUTPATIENT)
Dept: INTERPRETER SERVICES | Facility: CLINIC | Age: 68
End: 2023-11-22
Payer: MEDICARE

## 2023-11-22 NOTE — TELEPHONE ENCOUNTER
Received response from Dr. Wise:     Roni Wise MD Hair, Ashley W RN  Caller: Unspecified (2 days ago,  3:56 PM)  Monitor reviewed. Infrequent SVT episodes. Symptoms were improving when I last saw her. If palpitations worsen, would consider B-blocker or calcium channel blocker such as diltiazem for symptomatic relief. Thanks     Called pt with [facility] , no answer. Left  requesting call back to Team 1 to review results.     Addendum 11/29/23 - Called pt with [facility]  and reviewed results. Pt stated her symptoms have improved and she is feeling much better. Pt was not interested in starting any medications at this time.

## 2025-08-07 ENCOUNTER — TRANSCRIBE ORDERS (OUTPATIENT)
Dept: OTHER | Age: 70
End: 2025-08-07

## 2025-08-07 ENCOUNTER — MEDICAL CORRESPONDENCE (OUTPATIENT)
Dept: HEALTH INFORMATION MANAGEMENT | Facility: CLINIC | Age: 70
End: 2025-08-07
Payer: COMMERCIAL

## 2025-08-07 DIAGNOSIS — K21.9 GERD (GASTROESOPHAGEAL REFLUX DISEASE): Primary | ICD-10-CM

## (undated) DEVICE — INTRODUCER CATH VASC 5FRX10CM  MPIS-501-NT-U-SST

## (undated) DEVICE — CATH ANGIO JUDKINS R4 6FRX100CM INFINITI 534621T

## (undated) DEVICE — CATH ANGIO INFINITI 3DRC 6FRX100CM 534676T

## (undated) DEVICE — DEFIB PRO-PADZ LVP LQD GEL ADULT 8900-2105-01

## (undated) DEVICE — MANIFOLD KIT ANGIO AUTOMATED 014613

## (undated) DEVICE — INTRO SHEATH 6FRX10CM PINNACLE RSS602

## (undated) DEVICE — CLOSURE ANGIOSEAL 6FR 610130

## (undated) DEVICE — KIT HAND CONTROL ANGIOTOUCH ACIST 65CM AT-P65

## (undated) DEVICE — CATH ANGIO JUDKINS JL4 6FRX100CM INFINITI 534620T

## (undated) DEVICE — TOTE ANGIO CORP PC15AT SAN32CC83O

## (undated) RX ORDER — FENTANYL CITRATE 50 UG/ML
INJECTION, SOLUTION INTRAMUSCULAR; INTRAVENOUS
Status: DISPENSED
Start: 2019-02-26

## (undated) RX ORDER — LIDOCAINE HYDROCHLORIDE 10 MG/ML
INJECTION, SOLUTION EPIDURAL; INFILTRATION; INTRACAUDAL; PERINEURAL
Status: DISPENSED
Start: 2019-02-26

## (undated) RX ORDER — HEPARIN SODIUM 1000 [USP'U]/ML
INJECTION, SOLUTION INTRAVENOUS; SUBCUTANEOUS
Status: DISPENSED
Start: 2019-02-26